# Patient Record
Sex: FEMALE | Race: BLACK OR AFRICAN AMERICAN | NOT HISPANIC OR LATINO
[De-identification: names, ages, dates, MRNs, and addresses within clinical notes are randomized per-mention and may not be internally consistent; named-entity substitution may affect disease eponyms.]

---

## 2017-05-08 ENCOUNTER — APPOINTMENT (OUTPATIENT)
Dept: RHEUMATOLOGY | Facility: CLINIC | Age: 82
End: 2017-05-08

## 2017-05-08 VITALS
BODY MASS INDEX: 31.24 KG/M2 | OXYGEN SATURATION: 98 % | HEIGHT: 64 IN | WEIGHT: 183 LBS | HEART RATE: 66 BPM | DIASTOLIC BLOOD PRESSURE: 76 MMHG | TEMPERATURE: 98.3 F | SYSTOLIC BLOOD PRESSURE: 138 MMHG

## 2017-05-08 DIAGNOSIS — Z87.39 PERSONAL HISTORY OF OTHER DISEASES OF THE MUSCULOSKELETAL SYSTEM AND CONNECTIVE TISSUE: ICD-10-CM

## 2017-05-08 DIAGNOSIS — I10 ESSENTIAL (PRIMARY) HYPERTENSION: ICD-10-CM

## 2017-05-08 DIAGNOSIS — Z85.038 PERSONAL HISTORY OF OTHER MALIGNANT NEOPLASM OF LARGE INTESTINE: ICD-10-CM

## 2017-05-08 DIAGNOSIS — Z80.1 FAMILY HISTORY OF MALIGNANT NEOPLASM OF TRACHEA, BRONCHUS AND LUNG: ICD-10-CM

## 2017-05-08 DIAGNOSIS — E78.5 HYPERLIPIDEMIA, UNSPECIFIED: ICD-10-CM

## 2017-05-08 RX ORDER — ALENDRONATE SODIUM 70 MG/1
70 TABLET ORAL
Qty: 4 | Refills: 0 | Status: ACTIVE | COMMUNITY
Start: 2017-01-06

## 2017-05-08 RX ORDER — OMEPRAZOLE 20 MG/1
20 CAPSULE, DELAYED RELEASE ORAL
Qty: 90 | Refills: 0 | Status: ACTIVE | COMMUNITY
Start: 2016-10-28

## 2017-05-08 RX ORDER — CLINDAMYCIN PHOSPHATE 1 G/10ML
1 GEL TOPICAL
Qty: 60 | Refills: 0 | Status: ACTIVE | COMMUNITY
Start: 2017-01-07

## 2017-05-08 RX ORDER — ERYTHROMYCIN 20 MG/ML
2 SOLUTION TOPICAL
Qty: 60 | Refills: 0 | Status: ACTIVE | COMMUNITY
Start: 2017-01-07

## 2017-05-08 RX ORDER — UBIDECARENONE/VIT E ACET 100MG-5
CAPSULE ORAL
Refills: 0 | Status: ACTIVE | COMMUNITY

## 2017-05-08 RX ORDER — ZINC SULFATE 50(220)MG
220 (50 ZN) CAPSULE ORAL
Qty: 60 | Refills: 0 | Status: ACTIVE | COMMUNITY
Start: 2017-01-07

## 2017-05-08 RX ORDER — CALCIPOTRIENE 50 UG/G
0.01 OINTMENT TOPICAL
Qty: 60 | Refills: 0 | Status: ACTIVE | COMMUNITY
Start: 2017-01-07

## 2017-05-08 RX ORDER — VITAMIN E ACID SUCCINATE 268 MG
TABLET ORAL
Refills: 0 | Status: ACTIVE | COMMUNITY

## 2017-05-08 RX ORDER — AMLODIPINE BESYLATE 5 MG/1
TABLET ORAL
Refills: 0 | Status: ACTIVE | COMMUNITY

## 2017-05-08 RX ORDER — MULTIVIT-MIN/IRON/FOLIC ACID/K 18-600-40
CAPSULE ORAL
Refills: 0 | Status: ACTIVE | COMMUNITY

## 2017-05-12 LAB
25(OH)D3 SERPL-MCNC: 37.2 NG/ML
ALBUMIN SERPL ELPH-MCNC: 4.3 G/DL
ALP BLD-CCNC: 71 U/L
ALT SERPL-CCNC: 28 U/L
ANION GAP SERPL CALC-SCNC: 19 MMOL/L
AST SERPL-CCNC: 32 U/L
BASOPHILS # BLD AUTO: 0.03 K/UL
BASOPHILS NFR BLD AUTO: 0.9 %
BILIRUB SERPL-MCNC: 0.6 MG/DL
BUN SERPL-MCNC: 12 MG/DL
CALCIUM SERPL-MCNC: 8.8 MG/DL
CALCIUM SERPL-MCNC: 8.8 MG/DL
CHLORIDE SERPL-SCNC: 106 MMOL/L
CK SERPL-CCNC: 201 U/L
CO2 SERPL-SCNC: 24 MMOL/L
CREAT SERPL-MCNC: 0.92 MG/DL
CRP SERPL-MCNC: 0.2 MG/DL
EOSINOPHIL # BLD AUTO: 0.24 K/UL
EOSINOPHIL NFR BLD AUTO: 7.4 %
ERYTHROCYTE [SEDIMENTATION RATE] IN BLOOD BY WESTERGREN METHOD: 19 MM/HR
GLUCOSE SERPL-MCNC: 96 MG/DL
HCT VFR BLD CALC: 43.7 %
HGB BLD-MCNC: 14.3 G/DL
IMM GRANULOCYTES NFR BLD AUTO: 0 %
LYMPHOCYTES # BLD AUTO: 0.85 K/UL
LYMPHOCYTES NFR BLD AUTO: 26.2 %
MAN DIFF?: NORMAL
MCHC RBC-ENTMCNC: 28.8 PG
MCHC RBC-ENTMCNC: 32.7 GM/DL
MCV RBC AUTO: 88.1 FL
MONOCYTES # BLD AUTO: 0.31 K/UL
MONOCYTES NFR BLD AUTO: 9.5 %
NEUTROPHILS # BLD AUTO: 1.82 K/UL
NEUTROPHILS NFR BLD AUTO: 56 %
PARATHYROID HORMONE INTACT: 121 PG/ML
PHOSPHATE SERPL-MCNC: 3.1 MG/DL
PLATELET # BLD AUTO: 201 K/UL
POTASSIUM SERPL-SCNC: 3.8 MMOL/L
PROT SERPL-MCNC: 7.4 G/DL
RBC # BLD: 4.96 M/UL
RBC # FLD: 13.8 %
SODIUM SERPL-SCNC: 149 MMOL/L
WBC # FLD AUTO: 3.25 K/UL

## 2017-05-15 LAB — COLLAGEN NTX SER-SCNC: 9.3 NM BCE

## 2017-07-11 ENCOUNTER — APPOINTMENT (OUTPATIENT)
Dept: RHEUMATOLOGY | Facility: CLINIC | Age: 82
End: 2017-07-11

## 2017-08-24 ENCOUNTER — RX RENEWAL (OUTPATIENT)
Age: 82
End: 2017-08-24

## 2017-08-24 RX ORDER — GABAPENTIN 300 MG/1
300 CAPSULE ORAL
Qty: 90 | Refills: 1 | Status: ACTIVE | COMMUNITY
Start: 2017-05-08 | End: 1900-01-01

## 2017-12-12 ENCOUNTER — APPOINTMENT (OUTPATIENT)
Dept: ORTHOPEDIC SURGERY | Facility: CLINIC | Age: 82
End: 2017-12-12
Payer: MEDICARE

## 2017-12-12 VITALS
HEIGHT: 63 IN | BODY MASS INDEX: 30.12 KG/M2 | SYSTOLIC BLOOD PRESSURE: 130 MMHG | DIASTOLIC BLOOD PRESSURE: 78 MMHG | WEIGHT: 170 LBS | HEART RATE: 70 BPM

## 2017-12-12 DIAGNOSIS — D17.20 BENIGN LIPOMATOUS NEOPLASM OF SKIN AND SUBCUTANEOUS TISSUE OF UNSPECIFIED LIMB: ICD-10-CM

## 2017-12-12 PROCEDURE — 99203 OFFICE O/P NEW LOW 30 MIN: CPT

## 2018-07-18 ENCOUNTER — EMERGENCY (EMERGENCY)
Facility: HOSPITAL | Age: 83
LOS: 1 days | Discharge: HOME | End: 2018-07-18
Attending: EMERGENCY MEDICINE
Payer: MEDICARE

## 2018-07-18 VITALS
TEMPERATURE: 98 F | OXYGEN SATURATION: 97 % | RESPIRATION RATE: 20 BRPM | WEIGHT: 175.05 LBS | DIASTOLIC BLOOD PRESSURE: 71 MMHG | HEART RATE: 57 BPM | SYSTOLIC BLOOD PRESSURE: 127 MMHG

## 2018-07-18 DIAGNOSIS — Z98.89 OTHER SPECIFIED POSTPROCEDURAL STATES: Chronic | ICD-10-CM

## 2018-07-18 PROCEDURE — 93971 EXTREMITY STUDY: CPT | Mod: 26

## 2018-07-18 PROCEDURE — 93971 EXTREMITY STUDY: CPT

## 2018-07-18 PROCEDURE — 99284 EMERGENCY DEPT VISIT MOD MDM: CPT | Mod: 25

## 2018-07-18 PROCEDURE — 99284 EMERGENCY DEPT VISIT MOD MDM: CPT

## 2018-07-18 RX ORDER — ACETAMINOPHEN 500 MG
975 TABLET ORAL ONCE
Qty: 0 | Refills: 0 | Status: COMPLETED | OUTPATIENT
Start: 2018-07-18 | End: 2018-07-18

## 2018-07-18 RX ORDER — DICLOFENAC SODIUM 30 MG/G
2 GEL TOPICAL
Qty: 1 | Refills: 0 | OUTPATIENT
Start: 2018-07-18 | End: 2018-08-16

## 2018-07-18 RX ORDER — IBUPROFEN 200 MG
600 TABLET ORAL ONCE
Qty: 0 | Refills: 0 | Status: COMPLETED | OUTPATIENT
Start: 2018-07-18 | End: 2018-07-18

## 2018-07-18 RX ORDER — CAPSAICIN 0.025 %
1 CREAM (GRAM) TOPICAL ONCE
Qty: 0 | Refills: 0 | Status: COMPLETED | OUTPATIENT
Start: 2018-07-18 | End: 2018-07-18

## 2018-07-18 RX ORDER — CAPSAICIN 0.025 %
1 CREAM (GRAM) TOPICAL ONCE
Qty: 0 | Refills: 0 | Status: DISCONTINUED | OUTPATIENT
Start: 2018-07-18 | End: 2018-07-18

## 2018-07-18 RX ADMIN — Medication 600 MILLIGRAM(S): at 16:17

## 2018-07-18 RX ADMIN — Medication 1 APPLICATION(S): at 16:18

## 2018-07-18 RX ADMIN — Medication 975 MILLIGRAM(S): at 16:18

## 2018-07-18 NOTE — ED PROVIDER NOTE - OBJECTIVE STATEMENT
82F with right posterior knee pain and swelling, ho Sibley's cyst. No trauma. Pain sharp, nonradiating, associated right leg swelling. NO DVT or PE history. patient has not taken anything for pain yet. NO f/c, no rashes

## 2018-07-18 NOTE — ED PROVIDER NOTE - CARE PLAN
Principal Discharge DX:	Baker's cyst of knee, right  Assessment and plan of treatment:	Your ultrasound shows a Baker's cyst, no blood clot.     You may use Tylenol 650mg every 8 hours as needed for pain. You may also use the pain cream prescribed to you, apply to the area as directed.     Please follow up with our orthopedics doctors in 1-2 weeks if your pain does not improve.

## 2018-07-18 NOTE — ED PROVIDER NOTE - PHYSICAL EXAMINATION
right knee: no joint effusion, nontender anterolateral knee, +fullness and ttp of right popliteal fossa, no calf ttp or swelling b/l, nontender right lower leg/ankle and foot. NV intact b/l LEs. skin no rashes

## 2018-07-18 NOTE — ED PROVIDER NOTE - PSH
S/P colon resection  1998  S/P hernia repair  Abdominal ( 1999 or 2000 )  S/P knee surgery  left  S/P shoulder surgery  right  S/P thyroid surgery  For Goiter ( 1959 )

## 2018-07-18 NOTE — ED ADULT NURSE NOTE - OBJECTIVE STATEMENT
81 y/o F presents to ED c/o R posterior knee pain and swelling, hx Baker's cyst. No trauma/injury. Pt also c/o associated R leg swelling. Pt has not taken anything for the pain yet. No fevers, chills, numbness/tingling/weakness.

## 2018-07-18 NOTE — ED PROVIDER NOTE - MEDICAL DECISION MAKING DETAILS
Attending MD Stephens: 82F with known Baker's cyst presenting with right posterior knee pain, no trauma. Exam notable for popliteal fullness consistent with Baker's cyst. Will obtain US to ro DVT however given report of leg swelling. PO analgesia and topical NSAIDs for pain

## 2018-07-18 NOTE — ED PROVIDER NOTE - PLAN OF CARE
Your ultrasound shows a Baker's cyst, no blood clot.     You may use Tylenol 650mg every 8 hours as needed for pain. You may also use the pain cream prescribed to you, apply to the area as directed.     Please follow up with our orthopedics doctors in 1-2 weeks if your pain does not improve.

## 2018-11-06 ENCOUNTER — INPATIENT (INPATIENT)
Facility: HOSPITAL | Age: 83
LOS: 3 days | Discharge: HOME | End: 2018-11-10
Attending: INTERNAL MEDICINE | Admitting: INTERNAL MEDICINE
Payer: MEDICARE

## 2018-11-06 VITALS
RESPIRATION RATE: 18 BRPM | DIASTOLIC BLOOD PRESSURE: 80 MMHG | HEART RATE: 85 BPM | OXYGEN SATURATION: 98 % | TEMPERATURE: 98 F | SYSTOLIC BLOOD PRESSURE: 148 MMHG

## 2018-11-06 DIAGNOSIS — Z98.89 OTHER SPECIFIED POSTPROCEDURAL STATES: Chronic | ICD-10-CM

## 2018-11-06 DIAGNOSIS — M54.9 DORSALGIA, UNSPECIFIED: ICD-10-CM

## 2018-11-06 LAB
ALBUMIN SERPL ELPH-MCNC: 4 G/DL — SIGNIFICANT CHANGE UP (ref 3.3–5)
ALP SERPL-CCNC: 81 U/L — SIGNIFICANT CHANGE UP (ref 40–120)
ALT FLD-CCNC: 25 U/L — SIGNIFICANT CHANGE UP (ref 4–33)
AST SERPL-CCNC: 28 U/L — SIGNIFICANT CHANGE UP (ref 4–32)
BASOPHILS # BLD AUTO: 0.04 K/UL — SIGNIFICANT CHANGE UP (ref 0–0.2)
BASOPHILS NFR BLD AUTO: 1 % — SIGNIFICANT CHANGE UP (ref 0–2)
BILIRUB SERPL-MCNC: 0.4 MG/DL — SIGNIFICANT CHANGE UP (ref 0.2–1.2)
BUN SERPL-MCNC: 12 MG/DL — SIGNIFICANT CHANGE UP (ref 7–23)
CALCIUM SERPL-MCNC: 8.4 MG/DL — SIGNIFICANT CHANGE UP (ref 8.4–10.5)
CHLORIDE SERPL-SCNC: 104 MMOL/L — SIGNIFICANT CHANGE UP (ref 98–107)
CO2 SERPL-SCNC: 28 MMOL/L — SIGNIFICANT CHANGE UP (ref 22–31)
CREAT SERPL-MCNC: 0.8 MG/DL — SIGNIFICANT CHANGE UP (ref 0.5–1.3)
EOSINOPHIL # BLD AUTO: 0.21 K/UL — SIGNIFICANT CHANGE UP (ref 0–0.5)
EOSINOPHIL NFR BLD AUTO: 5.4 % — SIGNIFICANT CHANGE UP (ref 0–6)
GLUCOSE SERPL-MCNC: 142 MG/DL — HIGH (ref 70–99)
HCT VFR BLD CALC: 42.2 % — SIGNIFICANT CHANGE UP (ref 34.5–45)
HGB BLD-MCNC: 13.8 G/DL — SIGNIFICANT CHANGE UP (ref 11.5–15.5)
IMM GRANULOCYTES # BLD AUTO: 0.02 # — SIGNIFICANT CHANGE UP
IMM GRANULOCYTES NFR BLD AUTO: 0.5 % — SIGNIFICANT CHANGE UP (ref 0–1.5)
LYMPHOCYTES # BLD AUTO: 1.18 K/UL — SIGNIFICANT CHANGE UP (ref 1–3.3)
LYMPHOCYTES # BLD AUTO: 30.3 % — SIGNIFICANT CHANGE UP (ref 13–44)
MCHC RBC-ENTMCNC: 29.5 PG — SIGNIFICANT CHANGE UP (ref 27–34)
MCHC RBC-ENTMCNC: 32.7 % — SIGNIFICANT CHANGE UP (ref 32–36)
MCV RBC AUTO: 90.2 FL — SIGNIFICANT CHANGE UP (ref 80–100)
MONOCYTES # BLD AUTO: 0.38 K/UL — SIGNIFICANT CHANGE UP (ref 0–0.9)
MONOCYTES NFR BLD AUTO: 9.7 % — SIGNIFICANT CHANGE UP (ref 2–14)
NEUTROPHILS # BLD AUTO: 2.07 K/UL — SIGNIFICANT CHANGE UP (ref 1.8–7.4)
NEUTROPHILS NFR BLD AUTO: 53.1 % — SIGNIFICANT CHANGE UP (ref 43–77)
NRBC # FLD: 0 — SIGNIFICANT CHANGE UP
PLATELET # BLD AUTO: 187 K/UL — SIGNIFICANT CHANGE UP (ref 150–400)
PMV BLD: 10.1 FL — SIGNIFICANT CHANGE UP (ref 7–13)
POTASSIUM SERPL-MCNC: 3.3 MMOL/L — LOW (ref 3.5–5.3)
POTASSIUM SERPL-SCNC: 3.3 MMOL/L — LOW (ref 3.5–5.3)
PROT SERPL-MCNC: 6.9 G/DL — SIGNIFICANT CHANGE UP (ref 6–8.3)
RBC # BLD: 4.68 M/UL — SIGNIFICANT CHANGE UP (ref 3.8–5.2)
RBC # FLD: 13.8 % — SIGNIFICANT CHANGE UP (ref 10.3–14.5)
SODIUM SERPL-SCNC: 145 MMOL/L — SIGNIFICANT CHANGE UP (ref 135–145)
WBC # BLD: 3.9 K/UL — SIGNIFICANT CHANGE UP (ref 3.8–10.5)
WBC # FLD AUTO: 3.9 K/UL — SIGNIFICANT CHANGE UP (ref 3.8–10.5)

## 2018-11-06 PROCEDURE — 99223 1ST HOSP IP/OBS HIGH 75: CPT

## 2018-11-06 RX ORDER — OXYCODONE AND ACETAMINOPHEN 5; 325 MG/1; MG/1
1 TABLET ORAL ONCE
Qty: 0 | Refills: 0 | Status: DISCONTINUED | OUTPATIENT
Start: 2018-11-06 | End: 2018-11-06

## 2018-11-06 RX ORDER — MORPHINE SULFATE 50 MG/1
4 CAPSULE, EXTENDED RELEASE ORAL EVERY 4 HOURS
Qty: 0 | Refills: 0 | Status: DISCONTINUED | OUTPATIENT
Start: 2018-11-06 | End: 2018-11-10

## 2018-11-06 RX ORDER — ONDANSETRON 8 MG/1
4 TABLET, FILM COATED ORAL ONCE
Qty: 0 | Refills: 0 | Status: COMPLETED | OUTPATIENT
Start: 2018-11-06 | End: 2018-11-06

## 2018-11-06 RX ORDER — GABAPENTIN 400 MG/1
300 CAPSULE ORAL EVERY 8 HOURS
Qty: 0 | Refills: 0 | Status: DISCONTINUED | OUTPATIENT
Start: 2018-11-06 | End: 2018-11-10

## 2018-11-06 RX ORDER — HEPARIN SODIUM 5000 [USP'U]/ML
5000 INJECTION INTRAVENOUS; SUBCUTANEOUS EVERY 8 HOURS
Qty: 0 | Refills: 0 | Status: DISCONTINUED | OUTPATIENT
Start: 2018-11-06 | End: 2018-11-10

## 2018-11-06 RX ORDER — MORPHINE SULFATE 50 MG/1
4 CAPSULE, EXTENDED RELEASE ORAL ONCE
Qty: 0 | Refills: 0 | Status: DISCONTINUED | OUTPATIENT
Start: 2018-11-06 | End: 2018-11-06

## 2018-11-06 RX ORDER — OXYCODONE HYDROCHLORIDE 5 MG/1
5 TABLET ORAL ONCE
Qty: 0 | Refills: 0 | Status: DISCONTINUED | OUTPATIENT
Start: 2018-11-06 | End: 2018-11-06

## 2018-11-06 RX ORDER — DIAZEPAM 5 MG
2 TABLET ORAL ONCE
Qty: 0 | Refills: 0 | Status: DISCONTINUED | OUTPATIENT
Start: 2018-11-06 | End: 2018-11-06

## 2018-11-06 RX ORDER — TRAMADOL HYDROCHLORIDE 50 MG/1
50 TABLET ORAL EVERY 6 HOURS
Qty: 0 | Refills: 0 | Status: DISCONTINUED | OUTPATIENT
Start: 2018-11-06 | End: 2018-11-07

## 2018-11-06 RX ORDER — LIDOCAINE 4 G/100G
1 CREAM TOPICAL ONCE
Qty: 0 | Refills: 0 | Status: COMPLETED | OUTPATIENT
Start: 2018-11-06 | End: 2018-11-06

## 2018-11-06 RX ORDER — ACETAMINOPHEN 500 MG
650 TABLET ORAL ONCE
Qty: 0 | Refills: 0 | Status: COMPLETED | OUTPATIENT
Start: 2018-11-06 | End: 2018-11-06

## 2018-11-06 RX ADMIN — OXYCODONE AND ACETAMINOPHEN 1 TABLET(S): 5; 325 TABLET ORAL at 19:47

## 2018-11-06 RX ADMIN — OXYCODONE AND ACETAMINOPHEN 1 TABLET(S): 5; 325 TABLET ORAL at 21:04

## 2018-11-06 RX ADMIN — MORPHINE SULFATE 4 MILLIGRAM(S): 50 CAPSULE, EXTENDED RELEASE ORAL at 16:20

## 2018-11-06 RX ADMIN — ONDANSETRON 4 MILLIGRAM(S): 8 TABLET, FILM COATED ORAL at 16:21

## 2018-11-06 RX ADMIN — Medication 650 MILLIGRAM(S): at 16:12

## 2018-11-06 RX ADMIN — Medication 2 MILLIGRAM(S): at 13:44

## 2018-11-06 RX ADMIN — Medication 2 MILLIGRAM(S): at 19:47

## 2018-11-06 RX ADMIN — MORPHINE SULFATE 4 MILLIGRAM(S): 50 CAPSULE, EXTENDED RELEASE ORAL at 22:00

## 2018-11-06 RX ADMIN — LIDOCAINE 1 PATCH: 4 CREAM TOPICAL at 13:43

## 2018-11-06 RX ADMIN — MORPHINE SULFATE 4 MILLIGRAM(S): 50 CAPSULE, EXTENDED RELEASE ORAL at 22:25

## 2018-11-06 RX ADMIN — Medication 650 MILLIGRAM(S): at 13:44

## 2018-11-06 RX ADMIN — OXYCODONE HYDROCHLORIDE 5 MILLIGRAM(S): 5 TABLET ORAL at 14:10

## 2018-11-06 RX ADMIN — MORPHINE SULFATE 4 MILLIGRAM(S): 50 CAPSULE, EXTENDED RELEASE ORAL at 17:00

## 2018-11-06 RX ADMIN — OXYCODONE HYDROCHLORIDE 5 MILLIGRAM(S): 5 TABLET ORAL at 16:12

## 2018-11-06 NOTE — ED PROVIDER NOTE - PROGRESS NOTE DETAILS
anand: pt received at sign over at 4pm. Seen by myself. she has a many year hx of back pain. sx were more persistent starting dec 2017. Had an MRI, results?? HAd an epidural? feb 2018, with relief of pain until approx last week, when pt noted low bacok pain radiating down rt lateral leg. told of sciatica by opt orthopedist and restared on gabapentin. Pt finds that gabapentin makes her drowsy and complaince is questionable. she uses occasional tylenol and aleve bid. She came to ED because of worsening pain.  exam: painful rt str leg raise at approx 4- degrees, with increasing pain to dorsiflexion of foot. full and nontender ROM rt hip.  Attempts made to discharge pt, including 2 doses percocet and valium. pt unable to sit up w/o severe pain. she is admitted for pain control.

## 2018-11-06 NOTE — H&P ADULT - NSHPPHYSICALEXAM_GEN_ALL_CORE
T(C): 36.6 (11-06-18 @ 22:30), Max: 36.8 (11-06-18 @ 20:41)  HR: 74 (11-06-18 @ 22:30) (60 - 85)  BP: 128/78 (11-06-18 @ 22:30) (128/78 - 148/80)  RR: 16 (11-06-18 @ 22:30) (16 - 18)  SpO2: 99% (11-06-18 @ 22:30) (98% - 99%)    GENERAL: No acute distress, well-developed  HEAD:  Atraumatic, Normocephalic  ENT: EOMI, PERRL, conjunctiva and sclera clear, Neck supple, moist mucosa  CHEST/LUNG: Clear to auscultation bilaterally; No wheeze, equal breath sounds bilaterally   HEART: Regular rate and rhythm; No murmurs, rubs, or gallops  ABDOMEN: Soft, Nontender, Nondistended; Bowel sounds present, no organomegaly  EXTREMITIES:  2+ Peripheral Pulses, No clubbing, cyanosis, or edema  PSYCH: AAOx3, normal affect, normal behavior   NEUROLOGY: non-focal, cranial nerves intact  SKIN: Normal color, No rashes or lesions  MUSCULOSKELETAL: + straight leg raise - R leg, no spinal or paraspinal tenderness, no joint swelling

## 2018-11-06 NOTE — ED ADULT TRIAGE NOTE - CHIEF COMPLAINT QUOTE
p/t with hx sciatica c/o of rt hip pain for past few days unable to walk due to pain, no neuro deficits noted

## 2018-11-06 NOTE — ED ADULT NURSE NOTE - NSIMPLEMENTINTERV_GEN_ALL_ED
Implemented All Universal Safety Interventions:  [place] to call system. Call bell, personal items and telephone within reach. Instruct patient to call for assistance. Room bathroom lighting operational. Non-slip footwear when patient is off stretcher. Physically safe environment: no spills, clutter or unnecessary equipment. Stretcher in lowest position, wheels locked, appropriate side rails in place.

## 2018-11-06 NOTE — SOCIAL WORK INITIAL EVALUATION ADULT - PLAN
Met with pt and  at bedside in the ED. Patient is an 82 yr old, AA, Christianity,  woman, aox4. Pt ambulates with cane and recently started using a RW due to hip pain. Pt lives in a house with spouse (David Coon:[phone]) and other family members.  Patient has 10-12 steps to manage in the home. Pt's large family assists with ADLs as needed.  Pt reports she is going for outpatient PT for her back pain. Pt declines need for additional services and community resources at this time.     Written by Zoe Palumbo SWI  Reviewed by Rochelle Troncoso LMSW

## 2018-11-06 NOTE — H&P ADULT - NSHPLABSRESULTS_GEN_ALL_CORE
.  LABS:                         13.8   3.90  )-----------( 187      ( 06 Nov 2018 17:27 )             42.2     11-06    145  |  104  |  12  ----------------------------<  142<H>  3.3<L>   |  28  |  0.80    Ca    8.4      06 Nov 2018 17:27    TPro  6.9  /  Alb  4.0  /  TBili  0.4  /  DBili  x   /  AST  28  /  ALT  25  /  AlkPhos  81  11-06          RADIOLOGY, EKG & ADDITIONAL TESTS: Reviewed.

## 2018-11-06 NOTE — H&P ADULT - HISTORY OF PRESENT ILLNESS
81 yo F with h/o HTN, HLD, chronic back pain, sciatica presenting with R buttock pain x 1 week.  Pt states that she has sharp 10/10 pain in right buttock that shoots down her R leg to her calf, worse with movement and sitting, no alleviating factors. She has had this pain in the past a bout 1 year ago and had been given ?epidural shot and pain has been under control since then until last week. She states that this pain is different from her chronic back pain which is midline lumbar pain and tolerable with her home pain regimen. She was seen by her orthopedic surgeon (Dr Morales) 5 days ago and was told this was likely sciatica. MRI was ordered (scheduled in 2 days) and she was referred to pain management (appt scheduled for 11/28). She was also prescribed gabapentin for her pain. Since the then pain has gotten worse and pt has difficulty ambulating due to the pain so she came to the ED.      In ED pt was given vallium 2mg PO x2, morphine 4mg IV x2, Tylenol PO, oxydocodone 5mg PO and percocet   VS:  133/75  62  97.1  17  99% on RA

## 2018-11-06 NOTE — H&P ADULT - NSHPREVIEWOFSYSTEMS_GEN_ALL_CORE
REVIEW OF SYSTEMS:    CONSTITUTIONAL: No weakness, fevers or chills, no weight loss  EYES/ENT: No visual changes;  No dysphagia or odynophagia, no tinnitus  NECK: No pain or stiffness  RESPIRATORY: No cough, wheezing, hemoptysis; No shortness of breath  CARDIOVASCULAR: No chest pain or palpitations; No lower extremity edema  GASTROINTESTINAL: No abdominal or epigastric pain. No nausea, vomiting, or hematemesis; No diarrhea or constipation. No melena or hematochezia.  MUSCULOSKELETAL: +BACK PAIN, +RIGHT BUTTOCK PAIN, No joint pain, swelling, erythema or warmth  GENITOURINARY: No dysuria, frequency or hematuria, no suprapubic pain  NEUROLOGICAL: No numbness or weakness, no headache, no syncope, no gait abnormalities   SKIN: No itching, burning, rashes, or lesions   All other review of systems is negative unless indicated above.

## 2018-11-06 NOTE — ED PROVIDER NOTE - OBJECTIVE STATEMENT
82F PMH HTN, chronic back pain, sciatica p/w R hip pain. Pt has been having R hip for several days. Reports sharp pain with radiation down leg past calf. Was seen by her orthopedist on Friday who told her she had sciatica and prescribed Gabapentin 300mg TID, gave her a referral for an MRI and pain mgmt. Since that appointment, pt feels that her pain has worsened, and I not relieved by the medication. MRI and pain mgmt are scheduled for upcoming weeks, but since pt was in uncontrolled pain she came to ER. Is having difficulty ambulating at home due to pain. No f/c, CP, SOB, n/v/d. No recent injections, no trauma.

## 2018-11-06 NOTE — H&P ADULT - PROBLEM SELECTOR PLAN 1
- Acute worsening of sciatica with no clear inciting event  - Pain poorly controlled with outpt pain meds and pt's mobility significantly hindered due to pain  - Continue gabapentin  - IV morphine prn, tramadol prn  - PT consult   - Consider pain management consult  - Pt was scheduled for out pt MRI, ordered by her orthopedic surgeon. Contact ortho (Dr Kevin Conner) in am

## 2018-11-06 NOTE — ED ADULT NURSE NOTE - OBJECTIVE STATEMENT
Patient to room 29 with c/o right hip pain. Pt has family at bedside. Pt evaluated by MD. Pain medication given as ordered and patient waiting for further orders, and disposition.  ROSA Lopez

## 2018-11-06 NOTE — ED PROVIDER NOTE - NS ED ROS FT
Constitution: No Fever or chills  Eyes: No visual changes  HEENT: No URI symptoms  Cardio: No Chest pain  Resp: No SOB  GI: No abdominal pain  : No dysuria  MSK: +R hip pain, chronic back pain  Neuro: No Headache  Skin: No rashes  All other ROS as per HPI  Eric Montague, PGY-1

## 2018-11-06 NOTE — H&P ADULT - ASSESSMENT
81 yo F with h/o HTN, HLD, chronic back pain, sciatica presenting with R buttock pain x 1 week c/w her sciatica

## 2018-11-06 NOTE — ED PROVIDER NOTE - MEDICAL DECISION MAKING DETAILS
82F w/ R hip pain. No trauma, likely sciatica, osteoarthritis. Pt has good outpatient f/u but pain is currently uncontrolled. Will attempt pain control and if pt is feeling well enough to go home will d/c for outpatient MRI on Thursday.

## 2018-11-06 NOTE — ED PROVIDER NOTE - ATTENDING CONTRIBUTION TO CARE
PHILOMENA GARCIA MD: 83 yo F with a past medical history of Hypertension, chronic low back pain a/w sciatica presents with RIGHT hip pain for the last several days. Patient states she has a sharp pain with radiation down leg to her foot. Outpt was seen and started on gabapentin 300mg TID, and a referral for an MRI and referral to see pain management. Patient states she's having difficulty ambulating secondary to pain and the Gabapentin has not helped.      PHYSICAL EXAM:  Vital signs reviewed.  GENERAL: Patient is awake and alert and in no acute distress.  Non-toxic appearing.  A+Ox4  HEAD:  Airway patent.  No oropharyngeal edema.  No stridor.  Auricles are normal.    EYES: EOM grossly intact, conjunctiva non-injected and sclera clear  NECK: Supple, No vertebral point tenderness to palpation.  CHEST/LUNG: Lungs clear to auscultation bilaterally; no wheeze, no rhonchi,  no rales.    HEART: Regular rate and rhythm;   ABDOMEN: Soft, non-tender to palpation.  No rebound/no guarding.  Bowel sounds present x 4.   MSK/EXTREMITIES: No clubbing or cyanosis. Back is nontender, with no vertebral point tenderness to palpation, no CVAT.  Moving all 4 extremities.   Decreased ROM and strength on hip flexion secondary to pain.  Distally NVI.  2+ edema B/L LE.  NEURO: Neurologically grossly intact.   No obvious deficits.  Unable to ambulate secondary to pain.  PSYCH: Psychiatrically normal mood and affect.  No apparent risk to self or others.       DR. WADSWORTH, ATTENDING MD:    I performed a face to face bedside interview with patient regarding history of present illness, review of symptoms and past medical history. I completed an independent physical exam.  I have discussed patient's plan of care with the team of health care providers.   I agree with note as stated above, having amended the EMR as needed to reflect my findings. I have discussed the assessment and plan of care.  This includes during the time I functioned as the attending physician for this patient. PHILOMENA GARCIA MD: 81 yo F with a past medical history of Hypertension, chronic low back pain a/w sciatica presents with RIGHT hip pain for the last several days. Patient states she has a sharp pain with radiation down leg to her foot. Outpt was seen and started on gabapentin 300mg TID, and a referral for an MRI and referral to see pain management. Patient states she's having difficulty ambulating secondary to pain and the Gabapentin has not helped.  Patient denies F/C, HA, NP, chest pain, SOB, cough, abd pain, N/V/D/C, dizziness, urinary symptoms, BI/BI, numbness/tingling, specifically no saddle anesthsia or other complaints.     PHYSICAL EXAM:  Vital signs reviewed.  GENERAL: Patient is awake and alert and in no acute distress.  Non-toxic appearing.  A+Ox4  HEAD:  Airway patent.  No oropharyngeal edema.  No stridor.  Auricles are normal.    EYES: EOM grossly intact, conjunctiva non-injected and sclera clear  NECK: Supple, No vertebral point tenderness to palpation.  CHEST/LUNG: Lungs clear to auscultation bilaterally; no wheeze, no rhonchi,  no rales.    HEART: Regular rate and rhythm;   ABDOMEN: Soft, non-tender to palpation.  No rebound/no guarding.  Bowel sounds present x 4.   MSK/EXTREMITIES: No clubbing or cyanosis. Back is nontender, with no vertebral point tenderness to palpation, no CVAT.  Moving all 4 extremities.   Decreased ROM and strength on hip flexion secondary to pain.  Distally NVI.  2+ edema B/L LE.  NEURO: Neurologically grossly intact.   No obvious deficits.  Unable to ambulate secondary to pain.  PSYCH: Psychiatrically normal mood and affect.  No apparent risk to self or others.       DR. WADSWORTH, ATTENDING MD:    I performed a face to face bedside interview with patient regarding history of present illness, review of symptoms and past medical history. I completed an independent physical exam.  I have discussed patient's plan of care with the team of health care providers.   I agree with note as stated above, having amended the EMR as needed to reflect my findings. I have discussed the assessment and plan of care.  This includes during the time I functioned as the attending physician for this patient.

## 2018-11-06 NOTE — ED PROVIDER NOTE - PHYSICAL EXAMINATION
General: WDWN  HEENT: Normocephalic and atraumatic, EOMI, Trachea midline.   Cardiac: Normal S1 and S2 w/ RRR. No MRG.  Pulmonary: CTA bilaterally. No increased WOB.   Abdominal: Soft, NTND  Neurologic: Muscle strength 5/5 in BL lower limbs. There is some weakness with hip flexion due to pain.   Musculoskeletal: +straight leg raise, decreased ROM due to pain  Vascular: DP pulses in BL LE 2+ and equal. WWP  Skin: Color appropriate for race.   Psychiatric: Appropriate mood and affect. No apparent risk to self or others.  Eric Montague, PGY-1

## 2018-11-07 ENCOUNTER — TRANSCRIPTION ENCOUNTER (OUTPATIENT)
Age: 83
End: 2018-11-07

## 2018-11-07 DIAGNOSIS — I10 ESSENTIAL (PRIMARY) HYPERTENSION: ICD-10-CM

## 2018-11-07 DIAGNOSIS — M54.41 LUMBAGO WITH SCIATICA, RIGHT SIDE: ICD-10-CM

## 2018-11-07 DIAGNOSIS — E78.5 HYPERLIPIDEMIA, UNSPECIFIED: ICD-10-CM

## 2018-11-07 DIAGNOSIS — M54.9 DORSALGIA, UNSPECIFIED: ICD-10-CM

## 2018-11-07 LAB
BUN SERPL-MCNC: 17 MG/DL — SIGNIFICANT CHANGE UP (ref 7–23)
CALCIUM SERPL-MCNC: 8.5 MG/DL — SIGNIFICANT CHANGE UP (ref 8.4–10.5)
CHLORIDE SERPL-SCNC: 98 MMOL/L — SIGNIFICANT CHANGE UP (ref 98–107)
CO2 SERPL-SCNC: 29 MMOL/L — SIGNIFICANT CHANGE UP (ref 22–31)
CREAT SERPL-MCNC: 0.84 MG/DL — SIGNIFICANT CHANGE UP (ref 0.5–1.3)
GLUCOSE SERPL-MCNC: 136 MG/DL — HIGH (ref 70–99)
MAGNESIUM SERPL-MCNC: 2.4 MG/DL — SIGNIFICANT CHANGE UP (ref 1.6–2.6)
POTASSIUM SERPL-MCNC: 3.4 MMOL/L — LOW (ref 3.5–5.3)
POTASSIUM SERPL-SCNC: 3.4 MMOL/L — LOW (ref 3.5–5.3)
SODIUM SERPL-SCNC: 140 MMOL/L — SIGNIFICANT CHANGE UP (ref 135–145)

## 2018-11-07 RX ORDER — ASPIRIN/CALCIUM CARB/MAGNESIUM 324 MG
81 TABLET ORAL DAILY
Qty: 0 | Refills: 0 | Status: DISCONTINUED | OUTPATIENT
Start: 2018-11-07 | End: 2018-11-10

## 2018-11-07 RX ORDER — ASPIRIN/CALCIUM CARB/MAGNESIUM 324 MG
1 TABLET ORAL
Qty: 0 | Refills: 0 | COMMUNITY

## 2018-11-07 RX ORDER — SIMVASTATIN 20 MG/1
1 TABLET, FILM COATED ORAL
Qty: 0 | Refills: 0 | COMMUNITY

## 2018-11-07 RX ORDER — ACETAMINOPHEN 500 MG
2 TABLET ORAL
Qty: 0 | Refills: 0 | COMMUNITY

## 2018-11-07 RX ORDER — ALENDRONATE SODIUM 70 MG/1
1 TABLET ORAL
Qty: 0 | Refills: 0 | COMMUNITY

## 2018-11-07 RX ORDER — PREGABALIN 225 MG/1
1000 CAPSULE ORAL DAILY
Qty: 0 | Refills: 0 | Status: DISCONTINUED | OUTPATIENT
Start: 2018-11-07 | End: 2018-11-10

## 2018-11-07 RX ORDER — POTASSIUM CHLORIDE 20 MEQ
20 PACKET (EA) ORAL ONCE
Qty: 0 | Refills: 0 | Status: COMPLETED | OUTPATIENT
Start: 2018-11-07 | End: 2018-11-07

## 2018-11-07 RX ORDER — OXYCODONE AND ACETAMINOPHEN 5; 325 MG/1; MG/1
1 TABLET ORAL EVERY 4 HOURS
Qty: 0 | Refills: 0 | Status: DISCONTINUED | OUTPATIENT
Start: 2018-11-07 | End: 2018-11-08

## 2018-11-07 RX ORDER — AMLODIPINE BESYLATE 2.5 MG/1
10 TABLET ORAL DAILY
Qty: 0 | Refills: 0 | Status: DISCONTINUED | OUTPATIENT
Start: 2018-11-07 | End: 2018-11-10

## 2018-11-07 RX ORDER — GABAPENTIN 400 MG/1
1 CAPSULE ORAL
Qty: 0 | Refills: 0 | COMMUNITY

## 2018-11-07 RX ORDER — SIMVASTATIN 20 MG/1
5 TABLET, FILM COATED ORAL AT BEDTIME
Qty: 0 | Refills: 0 | Status: DISCONTINUED | OUTPATIENT
Start: 2018-11-07 | End: 2018-11-10

## 2018-11-07 RX ORDER — POTASSIUM CHLORIDE 20 MEQ
1 PACKET (EA) ORAL
Qty: 0 | Refills: 0 | COMMUNITY

## 2018-11-07 RX ORDER — AMLODIPINE BESYLATE 2.5 MG/1
1 TABLET ORAL
Qty: 0 | Refills: 0 | COMMUNITY

## 2018-11-07 RX ORDER — PANTOPRAZOLE SODIUM 20 MG/1
40 TABLET, DELAYED RELEASE ORAL
Qty: 0 | Refills: 0 | Status: DISCONTINUED | OUTPATIENT
Start: 2018-11-07 | End: 2018-11-10

## 2018-11-07 RX ADMIN — GABAPENTIN 300 MILLIGRAM(S): 400 CAPSULE ORAL at 06:18

## 2018-11-07 RX ADMIN — MORPHINE SULFATE 4 MILLIGRAM(S): 50 CAPSULE, EXTENDED RELEASE ORAL at 06:40

## 2018-11-07 RX ADMIN — HEPARIN SODIUM 5000 UNIT(S): 5000 INJECTION INTRAVENOUS; SUBCUTANEOUS at 06:18

## 2018-11-07 RX ADMIN — LIDOCAINE 1 PATCH: 4 CREAM TOPICAL at 01:31

## 2018-11-07 RX ADMIN — SIMVASTATIN 5 MILLIGRAM(S): 20 TABLET, FILM COATED ORAL at 22:01

## 2018-11-07 RX ADMIN — MORPHINE SULFATE 4 MILLIGRAM(S): 50 CAPSULE, EXTENDED RELEASE ORAL at 06:18

## 2018-11-07 RX ADMIN — GABAPENTIN 300 MILLIGRAM(S): 400 CAPSULE ORAL at 22:01

## 2018-11-07 RX ADMIN — HEPARIN SODIUM 5000 UNIT(S): 5000 INJECTION INTRAVENOUS; SUBCUTANEOUS at 13:25

## 2018-11-07 RX ADMIN — GABAPENTIN 300 MILLIGRAM(S): 400 CAPSULE ORAL at 13:25

## 2018-11-07 RX ADMIN — Medication 81 MILLIGRAM(S): at 13:25

## 2018-11-07 RX ADMIN — HEPARIN SODIUM 5000 UNIT(S): 5000 INJECTION INTRAVENOUS; SUBCUTANEOUS at 22:01

## 2018-11-07 RX ADMIN — AMLODIPINE BESYLATE 10 MILLIGRAM(S): 2.5 TABLET ORAL at 13:26

## 2018-11-07 RX ADMIN — Medication 1 TABLET(S): at 13:25

## 2018-11-07 RX ADMIN — PANTOPRAZOLE SODIUM 40 MILLIGRAM(S): 20 TABLET, DELAYED RELEASE ORAL at 08:49

## 2018-11-07 RX ADMIN — Medication 20 MILLIEQUIVALENT(S): at 16:54

## 2018-11-07 RX ADMIN — PREGABALIN 1000 MICROGRAM(S): 225 CAPSULE ORAL at 13:25

## 2018-11-07 NOTE — PHYSICAL THERAPY INITIAL EVALUATION ADULT - ADDITIONAL COMMENTS
Pt. reports owning DME of straight cane, rolling walker.     Pt. was left supine in bed post PT Evaluation, NAD, call malin within reach. ROSA Fragoso made aware of pt. status and participation in PT.

## 2018-11-07 NOTE — PROGRESS NOTE ADULT - PROBLEM SELECTOR PLAN 1
ortho spine evaluation called by me  yvonne continue pain meds  pain management evaluation  MRI LS spine

## 2018-11-07 NOTE — DISCHARGE NOTE ADULT - MEDICATION SUMMARY - MEDICATIONS TO STOP TAKING
I will STOP taking the medications listed below when I get home from the hospital:    Pennsaid 2% topical solution  -- Apply on skin to affected area , As Needed

## 2018-11-07 NOTE — PHYSICAL THERAPY INITIAL EVALUATION ADULT - CRITERIA FOR SKILLED THERAPEUTIC INTERVENTIONS
risk reduction/prevention/therapy frequency/rehab potential/anticipated discharge recommendation/impairments found/predicted duration of therapy intervention

## 2018-11-07 NOTE — DISCHARGE NOTE ADULT - OTHER SIGNIFICANT FINDINGS
MR Lumbar Spine No Cont (11.08.18 @ 02:40) >  IMPRESSION:  Multilevel degenerative spondylosis, increased from 2009.  Likely insufficiency fracture of L4 with superior concavity of the L4   endplate associated with marrow edema.  Abnormal intervertebral disc space signal at T10-T11 associated marrow   edema. Findings may represent degenerative change however, if there is   clinical concern for discitis osteomyelitis, contrast-enhanced MR imaging   of the thoracic spine may be done for further evaluation.

## 2018-11-07 NOTE — PHYSICAL THERAPY INITIAL EVALUATION ADULT - LIVES WITH, PROFILE
Lives in a house with her . Has 3 steps to enter. 3 steps then 6 then 2 inside with bilateral handrails.

## 2018-11-07 NOTE — PROGRESS NOTE ADULT - SUBJECTIVE AND OBJECTIVE BOX
Patient is a 82y old  Female who presents with a chief complaint of R buttock pain/sciatica (06 Nov 2018 23:36)  patient not known to me, assigned this AM to assume care  chart reviewed and events thus far noted   admitted overnight by full time hospitalist service       SUBJECTIVE / OVERNIGHT EVENTS: no overnight events    ROS:  Resp: No cough no sputum production  CVS: No chest pain no palpitations no orthopnea  GI: no N/V/D  : no dysuria, no hematuria  Neuro: no weakness no paresthesias  Heme: No petechiae no easy bruising  Msk: No joint pain no swelling  Skin: No rash no itching        MEDICATIONS  (STANDING):  amLODIPine   Tablet 10 milliGRAM(s) Oral daily  aspirin enteric coated 81 milliGRAM(s) Oral daily  calcium carbonate 1250 mG  + Vitamin D (OsCal 500 + D) 1 Tablet(s) Oral daily  cyanocobalamin 1000 MICROGram(s) Oral daily  gabapentin 300 milliGRAM(s) Oral every 8 hours  heparin  Injectable 5000 Unit(s) SubCutaneous every 8 hours  pantoprazole    Tablet 40 milliGRAM(s) Oral before breakfast  simvastatin 5 milliGRAM(s) Oral at bedtime    MEDICATIONS  (PRN):  morphine  - Injectable 4 milliGRAM(s) IV Push every 4 hours PRN Severe Pain (7 - 10)  oxyCODONE    5 mG/acetaminophen 325 mG 1 Tablet(s) Oral every 4 hours PRN Moderate Pain (4 - 6)        CAPILLARY BLOOD GLUCOSE        I&O's Summary      Vital Signs Last 24 Hrs  T(C): 36.6 (07 Nov 2018 06:10), Max: 36.8 (06 Nov 2018 20:41)  T(F): 97.9 (07 Nov 2018 06:10), Max: 98.2 (06 Nov 2018 20:41)  HR: 54 (07 Nov 2018 06:10) (52 - 85)  BP: 113/67 (07 Nov 2018 06:10) (113/67 - 151/80)  BP(mean): --  RR: 16 (07 Nov 2018 06:10) (16 - 18)  SpO2: 98% (07 Nov 2018 06:10) (97% - 99%)    GENERAL: No acute distress, well-developed  HEAD:  Atraumatic, Normocephalic  ENT: EOMI, PERRL, conjunctiva and sclera clear, Neck supple, moist mucosa  CHEST/LUNG: Clear to auscultation bilaterally; No wheeze, equal breath sounds bilaterally   HEART: Regular rate and rhythm; No murmurs, rubs, or gallops  ABDOMEN: Soft, Nontender, Nondistended; Bowel sounds present, no organomegaly  EXTREMITIES:  2+ Peripheral Pulses, No clubbing, cyanosis, or edema  PSYCH: AAOx3, normal affect, normal behavior   NEUROLOGY: non-focal, cranial nerves intact  SKIN: Normal color, No rashes or lesions  MUSCULOSKELETAL: + straight leg raise - R leg, no spinal or paraspinal tenderness, no joint swelling    LABS:                        13.8   3.90  )-----------( 187      ( 06 Nov 2018 17:27 )             42.2     11-06    145  |  104  |  12  ----------------------------<  142<H>  3.3<L>   |  28  |  0.80    Ca    8.4      06 Nov 2018 17:27    TPro  6.9  /  Alb  4.0  /  TBili  0.4  /  DBili  x   /  AST  28  /  ALT  25  /  AlkPhos  81  11-06      All consultant(s) notes reviewed and care discussed with other providers    Contact Number, Dr Velasco [phone]

## 2018-11-07 NOTE — DISCHARGE NOTE ADULT - PLAN OF CARE
Pain control Continue with pain control as prescribed. Out patient physical therapy. Out patient follow up with Dr. Iqbal (spine doctor). Continue current blood pressure medication regimen as directed. Monitor for any visual changes, headaches or dizziness.  Monitor blood pressure regularly.  Follow up with your PCP for further management for high blood pressure, please call to make appointment within 1 week of discharge Continue cholesterol control medications. Continue DASH diet. Follow up with your PCP within 1 week of discharge for further management and monitoring of lipid and cholesterol panels. Please call to make an appointment

## 2018-11-07 NOTE — DISCHARGE NOTE ADULT - HOSPITAL COURSE
81 yo F with h/o HTN, HLD, chronic back pain, sciatica presenting with R buttock pain x 1 week c/w her sciatica     Hospital course:    Exacerbation of chronic back pain.    -S/p MRI spine  -Ortho spine consulted: LSO brace  -ID consulted: Bcx: NTD, ESR normal, unlikely to be infectious   -Physical therapy consulted: recommended out patient PT   -Pain mgmt consulted: recommendations implemented including NSAID    Essential hypertension.   -Continued with home meds with hold parameters  -DASH/TLC diet    Hyperlipidemia, unspecified hyperlipidemia type.   -Continued statin  -DASH/TLC diet    Dispo: home with out patient PT and outpatient follow up with Dr. Iqbal

## 2018-11-07 NOTE — DISCHARGE NOTE ADULT - PATIENT PORTAL LINK FT
You can access the [facility] Patient Portal, offered by [facility], by registering with the following website: [email or web address]

## 2018-11-07 NOTE — DISCHARGE NOTE ADULT - CARE PROVIDER_API CALL
Heladio Iqbal (MD), Orthopaedic Surgery  [address] [place] [address]  Phone: [phone]  Fax: [phone]    Dr. Tucker,   Phone: (   )    -  Fax: (   )    -

## 2018-11-07 NOTE — DISCHARGE NOTE ADULT - MEDICATION SUMMARY - MEDICATIONS TO TAKE
I will START or STAY ON the medications listed below when I get home from the hospital:    Out patient physical therapy  -- 3x/week  -- Indication: For Walking/strength    naproxen 500 mg oral tablet  -- 1 tab(s) by mouth 2 times a day   -- Check with your doctor before becoming pregnant.  May cause drowsiness or dizziness.  Obtain medical advice before taking any non-prescription drugs as some may affect the action of this medication.  Take with food or milk.    -- Indication: For Exacerbation of chronic back pain    aspirin 81 mg oral delayed release tablet  -- 1 tab(s) by mouth once a day  -- Indication: For Prophylaxis    Tylenol 8 HR Arthritis Pain 650 mg oral tablet, extended release  -- 2 tab(s) by mouth every 8 hours, As Needed  -- Indication: For Chronic midline low back pain with right-sided sciatica    gabapentin 300 mg oral capsule  -- 1 cap(s) by mouth 3 times a day  -- Indication: For Chronic midline low back pain with right-sided sciatica    simvastatin 5 mg oral tablet  -- 1 tab(s) by mouth once a day (at bedtime)  -- Indication: For Hyperlipidemia, unspecified hyperlipidemia type    alendronate 70 mg oral tablet  -- 1 tab(s) by mouth once a week  -- Indication: For Osteoporosis    amLODIPine 10 mg oral tablet  -- 1 tab(s) by mouth once a day  -- Indication: For Hypertension    senna oral tablet  -- 2 tab(s) by mouth once a day (at bedtime)  -- Indication: For Constipation    polyethylene glycol 3350 oral powder for reconstitution  -- 17 gram(s) by mouth once a day  -- Indication: For Constipation    potassium chloride 20 mEq oral tablet, extended release  -- 1 tab(s) by mouth 2 times a day  -- Indication: For Supplement    zinc sulfate  -- 1 tab(s) by mouth once a day  -- Indication: For Supplement    pantoprazole 40 mg oral delayed release tablet  -- 1 tab(s) by mouth once a day (before a meal)  -- Indication: For Prophylaxis    Calcium 600+D oral tablet  -- 1 tab(s) by mouth once a day  -- Indication: For Supplement    vitamin E oral capsule  -- 1 cap(s) by mouth once a day  -- Indication: For Supplement    Vitamin C 500 mg oral tablet  -- 1 tab(s) by mouth once a day  -- Indication: For Supplement    Vitamin B-12  -- 1 tab(s) by mouth once a day  -- Indication: For Supplement

## 2018-11-07 NOTE — DISCHARGE NOTE ADULT - CARE PLAN
Principal Discharge DX:	Exacerbation of chronic back pain  Goal:	Pain control  Assessment and plan of treatment:	Continue with pain control as prescribed. Out patient physical therapy. Out patient follow up with Dr. Iqbal (spine doctor).  Secondary Diagnosis:	Essential hypertension  Assessment and plan of treatment:	Continue current blood pressure medication regimen as directed. Monitor for any visual changes, headaches or dizziness.  Monitor blood pressure regularly.  Follow up with your PCP for further management for high blood pressure, please call to make appointment within 1 week of discharge  Secondary Diagnosis:	Hyperlipidemia, unspecified hyperlipidemia type  Assessment and plan of treatment:	Continue cholesterol control medications. Continue DASH diet. Follow up with your PCP within 1 week of discharge for further management and monitoring of lipid and cholesterol panels. Please call to make an appointment

## 2018-11-07 NOTE — PHYSICAL THERAPY INITIAL EVALUATION ADULT - RANGE OF MOTION EXAMINATION, REHAB EVAL
Left LE ROM was WFL (within functional limits)/Right hip and knee ROM limited secondary to pain; right ankle ROM WNL/bilateral lower extremity was ROM was WNL (within normal limits)

## 2018-11-07 NOTE — DISCHARGE NOTE ADULT - ADDITIONAL INSTRUCTIONS
Follow up with PCP within 1 week of discharge.  Follow up with Dr. Iqbal (spine doctor) upon discharge.  Out patient physical therapy.

## 2018-11-07 NOTE — PHYSICAL THERAPY INITIAL EVALUATION ADULT - GAIT DEVIATIONS NOTED, PT EVAL
decreased brenda/increased time in double stance/decreased step length/decreased weight-shifting ability

## 2018-11-08 LAB
ALBUMIN SERPL ELPH-MCNC: 3.7 G/DL — SIGNIFICANT CHANGE UP (ref 3.3–5)
ALP SERPL-CCNC: 74 U/L — SIGNIFICANT CHANGE UP (ref 40–120)
ALT FLD-CCNC: 19 U/L — SIGNIFICANT CHANGE UP (ref 4–33)
AST SERPL-CCNC: 19 U/L — SIGNIFICANT CHANGE UP (ref 4–32)
BILIRUB SERPL-MCNC: 0.4 MG/DL — SIGNIFICANT CHANGE UP (ref 0.2–1.2)
BUN SERPL-MCNC: 20 MG/DL — SIGNIFICANT CHANGE UP (ref 7–23)
CALCIUM SERPL-MCNC: 9.2 MG/DL — SIGNIFICANT CHANGE UP (ref 8.4–10.5)
CHLORIDE SERPL-SCNC: 102 MMOL/L — SIGNIFICANT CHANGE UP (ref 98–107)
CO2 SERPL-SCNC: 29 MMOL/L — SIGNIFICANT CHANGE UP (ref 22–31)
CREAT SERPL-MCNC: 0.83 MG/DL — SIGNIFICANT CHANGE UP (ref 0.5–1.3)
CRP SERPL-MCNC: < 4 MG/L — SIGNIFICANT CHANGE UP
ERYTHROCYTE [SEDIMENTATION RATE] IN BLOOD: 12 MM/HR — SIGNIFICANT CHANGE UP (ref 4–25)
GLUCOSE SERPL-MCNC: 97 MG/DL — SIGNIFICANT CHANGE UP (ref 70–99)
MAGNESIUM SERPL-MCNC: 2.3 MG/DL — SIGNIFICANT CHANGE UP (ref 1.6–2.6)
POTASSIUM SERPL-MCNC: 3.7 MMOL/L — SIGNIFICANT CHANGE UP (ref 3.5–5.3)
POTASSIUM SERPL-SCNC: 3.7 MMOL/L — SIGNIFICANT CHANGE UP (ref 3.5–5.3)
PROT SERPL-MCNC: 6.6 G/DL — SIGNIFICANT CHANGE UP (ref 6–8.3)
SODIUM SERPL-SCNC: 141 MMOL/L — SIGNIFICANT CHANGE UP (ref 135–145)

## 2018-11-08 PROCEDURE — 72148 MRI LUMBAR SPINE W/O DYE: CPT | Mod: 26

## 2018-11-08 PROCEDURE — 99233 SBSQ HOSP IP/OBS HIGH 50: CPT

## 2018-11-08 RX ORDER — SENNA PLUS 8.6 MG/1
2 TABLET ORAL AT BEDTIME
Qty: 0 | Refills: 0 | Status: DISCONTINUED | OUTPATIENT
Start: 2018-11-08 | End: 2018-11-10

## 2018-11-08 RX ORDER — POLYETHYLENE GLYCOL 3350 17 G/17G
17 POWDER, FOR SOLUTION ORAL DAILY
Qty: 0 | Refills: 0 | Status: DISCONTINUED | OUTPATIENT
Start: 2018-11-08 | End: 2018-11-10

## 2018-11-08 RX ORDER — OXYCODONE AND ACETAMINOPHEN 5; 325 MG/1; MG/1
1 TABLET ORAL EVERY 4 HOURS
Qty: 0 | Refills: 0 | Status: DISCONTINUED | OUTPATIENT
Start: 2018-11-08 | End: 2018-11-10

## 2018-11-08 RX ADMIN — SIMVASTATIN 5 MILLIGRAM(S): 20 TABLET, FILM COATED ORAL at 21:18

## 2018-11-08 RX ADMIN — POLYETHYLENE GLYCOL 3350 17 GRAM(S): 17 POWDER, FOR SOLUTION ORAL at 18:28

## 2018-11-08 RX ADMIN — HEPARIN SODIUM 5000 UNIT(S): 5000 INJECTION INTRAVENOUS; SUBCUTANEOUS at 21:18

## 2018-11-08 RX ADMIN — PREGABALIN 1000 MICROGRAM(S): 225 CAPSULE ORAL at 13:49

## 2018-11-08 RX ADMIN — GABAPENTIN 300 MILLIGRAM(S): 400 CAPSULE ORAL at 21:18

## 2018-11-08 RX ADMIN — PANTOPRAZOLE SODIUM 40 MILLIGRAM(S): 20 TABLET, DELAYED RELEASE ORAL at 06:23

## 2018-11-08 RX ADMIN — SENNA PLUS 2 TABLET(S): 8.6 TABLET ORAL at 21:18

## 2018-11-08 RX ADMIN — Medication 81 MILLIGRAM(S): at 13:49

## 2018-11-08 RX ADMIN — Medication 1 TABLET(S): at 13:49

## 2018-11-08 RX ADMIN — GABAPENTIN 300 MILLIGRAM(S): 400 CAPSULE ORAL at 13:49

## 2018-11-08 RX ADMIN — AMLODIPINE BESYLATE 10 MILLIGRAM(S): 2.5 TABLET ORAL at 06:24

## 2018-11-08 RX ADMIN — GABAPENTIN 300 MILLIGRAM(S): 400 CAPSULE ORAL at 06:23

## 2018-11-08 RX ADMIN — HEPARIN SODIUM 5000 UNIT(S): 5000 INJECTION INTRAVENOUS; SUBCUTANEOUS at 06:23

## 2018-11-08 RX ADMIN — HEPARIN SODIUM 5000 UNIT(S): 5000 INJECTION INTRAVENOUS; SUBCUTANEOUS at 13:50

## 2018-11-08 NOTE — PROGRESS NOTE ADULT - SUBJECTIVE AND OBJECTIVE BOX
Patient is a 82y old  Female who presents with a chief complaint of R buttock pain/sciatica (07 Nov 2018 13:19)      SUBJECTIVE / OVERNIGHT EVENTS: no overnight events    ROS:  Resp: No cough no sputum production  CVS: No chest pain no palpitations no orthopnea  GI: no N/V/D  : no dysuria, no hematuria  Neuro: no weakness no paresthesias  Heme: No petechiae no easy bruising  Msk: No joint pain no swelling  Skin: No rash no itching        MEDICATIONS  (STANDING):  amLODIPine   Tablet 10 milliGRAM(s) Oral daily  aspirin enteric coated 81 milliGRAM(s) Oral daily  calcium carbonate 1250 mG  + Vitamin D (OsCal 500 + D) 1 Tablet(s) Oral daily  cyanocobalamin 1000 MICROGram(s) Oral daily  gabapentin 300 milliGRAM(s) Oral every 8 hours  heparin  Injectable 5000 Unit(s) SubCutaneous every 8 hours  pantoprazole    Tablet 40 milliGRAM(s) Oral before breakfast  simvastatin 5 milliGRAM(s) Oral at bedtime    MEDICATIONS  (PRN):  morphine  - Injectable 4 milliGRAM(s) IV Push every 4 hours PRN Severe Pain (7 - 10)  oxyCODONE    5 mG/acetaminophen 325 mG 1 Tablet(s) Oral every 4 hours PRN Moderate Pain (4 - 6)        CAPILLARY BLOOD GLUCOSE        I&O's Summary      Vital Signs Last 24 Hrs  T(C): 36.7 (08 Nov 2018 12:22), Max: 36.9 (07 Nov 2018 21:55)  T(F): 98.1 (08 Nov 2018 12:22), Max: 98.5 (07 Nov 2018 21:55)  HR: 70 (08 Nov 2018 12:22) (56 - 70)  BP: 112/68 (08 Nov 2018 12:22) (112/68 - 136/71)  BP(mean): --  RR: 18 (08 Nov 2018 12:22) (18 - 18)  SpO2: 96% (08 Nov 2018 12:22) (96% - 99%)    GENERAL: No acute distress, well-developed  HEAD:  Atraumatic, Normocephalic  ENT: EOMI, PERRL, conjunctiva and sclera clear, Neck supple, moist mucosa  CHEST/LUNG: Clear to auscultation bilaterally; No wheeze, equal breath sounds bilaterally   HEART: Regular rate and rhythm; No murmurs, rubs, or gallops  ABDOMEN: Soft, Nontender, Nondistended; Bowel sounds present, no organomegaly  EXTREMITIES:  2+ Peripheral Pulses, No clubbing, cyanosis, or edema  PSYCH: AAOx3, normal affect, normal behavior   NEUROLOGY: non-focal, cranial nerves intact  SKIN: Normal color, No rashes or lesions  MUSCULOSKELETAL: + straight leg raise - R leg, no spinal or paraspinal tenderness, no joint swelling    LABS:                        13.8   3.90  )-----------( 187      ( 06 Nov 2018 17:27 )             42.2     11-08    141  |  102  |  20  ----------------------------<  97  3.7   |  29  |  0.83    Ca    9.2      08 Nov 2018 05:24  Mg     2.3     11-08    TPro  6.6  /  Alb  3.7  /  TBili  0.4  /  DBili  x   /  AST  19  /  ALT  19  /  AlkPhos  74  11-08                All consultant(s) notes reviewed and care discussed with other providers    Contact Number, Dr Velasco [phone]

## 2018-11-08 NOTE — CONSULT NOTE ADULT - SUBJECTIVE AND OBJECTIVE BOX
Chief Complaint:    HPI:  83 yo F with h/o HTN, HLD, chronic back pain, sciatica presenting with R buttock pain x 1 week.  Pt states that she has sharp 10/10 pain in right buttock that shoots down her R leg to her calf, worse with movement and sitting, no alleviating factors. She has had this pain in the past a bout 1 year ago and had been given ?epidural shot and pain has been under control since then until last week. She states that this pain is different from her chronic back pain which is midline lumbar pain and tolerable with her home pain regimen. She was seen by her orthopedic surgeon (Dr Morales) 5 days ago and was told this was likely sciatica. MRI was ordered (scheduled in 2 days) and she was referred to pain management (appt scheduled for 11/28). She was also prescribed gabapentin for her pain. Since the then pain has gotten worse and pt has difficulty ambulating due to the pain so she came to the ED.      In ED pt was given vallium 2mg PO x2, morphine 4mg IV x2, Tylenol PO, oxydocodone 5mg PO and percocet   VS:  133/75  62  97.1  17  99% on RA (06 Nov 2018 23:36)      PAST MEDICAL & SURGICAL HISTORY:  Colon cancer  Hyperlipidemia  Obesity (BMI 30.0-34.9)  Goiter  Essential hypertension  S/P knee surgery: left  S/P shoulder surgery: right  S/P hernia repair: Abdominal ( 1999 or 2000 )  S/P colon resection: 1998  S/P thyroid surgery: For Goiter ( 1959 )      FAMILY HISTORY:  No pertinent family history in first degree relatives      SOCIAL HISTORY:  [ ] Denies Smoking, Alcohol, or Drug Use    Allergies    No Known Allergies    Intolerances        PAIN MEDICATIONS:  gabapentin 300 milliGRAM(s) Oral every 8 hours  morphine  - Injectable 4 milliGRAM(s) IV Push every 4 hours PRN  oxyCODONE    5 mG/acetaminophen 325 mG 1 Tablet(s) Oral every 4 hours PRN      Heme:  aspirin enteric coated 81 milliGRAM(s) Oral daily  heparin  Injectable 5000 Unit(s) SubCutaneous every 8 hours    Antibiotics:    Cardiovascular:  amLODIPine   Tablet 10 milliGRAM(s) Oral daily    GI:  pantoprazole    Tablet 40 milliGRAM(s) Oral before breakfast    Endocrine:  simvastatin 5 milliGRAM(s) Oral at bedtime    All Other Medications:  calcium carbonate 1250 mG  + Vitamin D (OsCal 500 + D) 1 Tablet(s) Oral daily  cyanocobalamin 1000 MICROGram(s) Oral daily      REVIEW OF SYSTEMS:    CONSTITUTIONAL: No fever, weight loss, or fatigue  EYES: No eye pain, visual disturbances, or discharge  ENMT:  No difficulty hearing, tinnitus, vertigo; No sinus or throat pain  NECK: No pain or stiffness        Vital Signs Last 24 Hrs  T(C): 36.7 (08 Nov 2018 12:22), Max: 36.9 (07 Nov 2018 21:55)  T(F): 98.1 (08 Nov 2018 12:22), Max: 98.5 (07 Nov 2018 21:55)  HR: 70 (08 Nov 2018 12:22) (56 - 70)  BP: 112/68 (08 Nov 2018 12:22) (112/68 - 136/71)  BP(mean): --  RR: 18 (08 Nov 2018 12:22) (18 - 18)  SpO2: 96% (08 Nov 2018 12:22) (96% - 99%)    PAIN SCORE:         SCALE USED: (1-10 VNRS)             PHYSICAL EXAM:    GENERAL: NAD, well-groomed, well-developed  HEAD:  Atraumatic, Normocephalic  EYES: EOMI, PERRLA, conjunctiva and sclera clear  ENMT: No tonsillar erythema, exudates, or enlargement; Moist mucous membranes, Good dentition, No lesions        LABS:                          13.8   3.90  )-----------( 187      ( 06 Nov 2018 17:27 )             42.2     11-08    141  |  102  |  20  ----------------------------<  97  3.7   |  29  |  0.83    Ca    9.2      08 Nov 2018 05:24  Mg     2.3     11-08    TPro  6.6  /  Alb  3.7  /  TBili  0.4  /  DBili  x   /  AST  19  /  ALT  19  /  AlkPhos  74  11-08        Subjective: "I have now pain right now, it's when I'm moving it hurts (8/10). The pain is in my right hip and it shoots down my leg and prevents me from laying on my right side or putting any weight on my right hip. The medication I'm taking now for the pain is ok but the pain is still there. I was taking gabapentin at home but I don't remember the dosage."    Objective: Pt. A&Ox3, NAD laying in bed on left side. Not able to move freely in the bed. Pt. answers all questions appropriately and maintains eye contact.       RECOMMENDATIONS  1. Add an NSAID of choice standing for 2days then PRN afterwards.   2. Increase gabapentin to 40mg TID  3. Add Tizanidine 1mg Q6hr PRN. Hold for oversedation.  4. Request ortho consult   5. Consider lidoderm patch on right hip.     [ ]  [place]  Reviewed and Copied to Chart

## 2018-11-08 NOTE — CONSULT NOTE ADULT - SUBJECTIVE AND OBJECTIVE BOX
81 yo F with History of HTN, HLD, chronic back pain, sciatica presenting with R buttock pain x 1 week.  Patient states the pain was minimal at first then progressively worsened over the course of 1 week where she was unable to ambulate without severe pain. Denies any bladder / bowel dysfunction, parasthesias, numbness, tingling. States she has pain radiating from lower back to the right buttovk hip region. Densies any falls, tramas, injury to the back. States she was seen by her orthopedic surgeon Dr Morales from [facility] 1-2 weeks ago and was told this was likely sciatica. Outpatient MRI was ordered and she was placed on gabapentin. States was unable to get scheduled MRI as she was having severe pain and presented to the Emergency Room.     Allergies    No Known Allergies    Intolerances                            13.8   3.90  )-----------( 187      ( 06 Nov 2018 17:27 )             42.2     08 Nov 2018 05:24    141    |  102    |  20     ----------------------------<  97     3.7     |  29     |  0.83     Ca    9.2        08 Nov 2018 05:24  Mg     2.3       08 Nov 2018 05:24    TPro  6.6    /  Alb  3.7    /  TBili  0.4    /  DBili  x      /  AST  19     /  ALT  19     /  AlkPhos  74     08 Nov 2018 05:24      Vital Signs Last 24 Hrs  T(C): 36.7 (11-08-18 @ 12:22), Max: 36.9 (11-07-18 @ 21:55)  T(F): 98.1 (11-08-18 @ 12:22), Max: 98.5 (11-07-18 @ 21:55)  HR: 70 (11-08-18 @ 12:22) (56 - 70)  BP: 112/68 (11-08-18 @ 12:22) (112/68 - 136/71)  BP(mean): --  RR: 18 (11-08-18 @ 12:22) (18 - 18)  SpO2: 96% (11-08-18 @ 12:22) (96% - 99%)        Physical Exam  Gen: NAD  BL LE: Left Lower Extremity 5/5 EHL/FHL/TA/GS, 5/5 Right Lower Extremity EHL/FHL/TA/GS.  4/5 Right lower extremity Quad /Hamstrings. 5/5 Left  Lower Extremity Quads/Hamstring. Able to SLR past 30 degrees on left, difficulty with SLR on the Right. Sensation is grossly intact to light touch in the bilateral distal extremity.       MR Lumbar Spine No Cont (11.08.18 @ 02:40) >  IMPRESSION:  Multilevel degenerative spondylosis, increased from 2009.  Likely insufficiency fracture of L4 with superior concavity of the L4   endplate associated with marrow edema.  Abnormal intervertebral disc space signal at T10-T11 associated marrow   edema. Findings may represent degenerative change however, if there is   clinical concern for discitis osteomyelitis, contrast-enhanced MR imaging   of the thoracic spine may be done for further evaluation.      A/P: 82y Female with L4 Insufficiency Fracture  - LSO Brace   - Pain control/ Analgesia  - PT/OT  - Monitor motor and sensory exam   - Will discuss care with Dr Iqbal and update with any further recommendations  - Call ortho with any questions or concerns.

## 2018-11-08 NOTE — PROGRESS NOTE ADULT - PROBLEM SELECTOR PLAN 1
ortho spine evaluation pending   will continue pain meds  pain management evaluation  MRI LS spine noted  suspicious T10/T11 lesion  check ESR/CRP  ID evaluation   may need repeat MRI with contrast

## 2018-11-08 NOTE — CONSULT NOTE ADULT - SUBJECTIVE AND OBJECTIVE BOX
HPI:  81 yo F with h/o HTN, HLD, chronic back pain, sciatica presenting with R buttock pain x 1 week.     The patient states that she developed pain to her right buttock last Wednesday.  On Thursday and Friday, the pain became more intense. It eventually increased to a 10/10.   The pain was described as sharp. It radiated down her leg. worse with pressure or movement.     She has had this pain in the past a bout 1 year ago and had been given ?epidural shot and pain has been under control since then until last week.    She was seen by her orthopedic surgeon (Dr Morales) 5 days ago and was told this was likely sciatica. MRI was ordered (scheduled in 2 days) and she was referred to pain management (appt scheduled for ).    She denies any trauma. she denies any associated fever. No febrile illness in over 6 months.    She denies any weight loss.        PAST MEDICAL & SURGICAL HISTORY:  Colon cancer  -s/p surgery and chemotherpay in   Hyperlipidemia  Obesity (BMI 30.0-34.9)  Goiter  Essential hypertension  S/P knee surgery: left  S/P shoulder surgery: right  S/P hernia repair: Abdominal (  or  )  S/P colon resection:   S/P thyroid surgery: For Goiter (  )      Allergies    No Known Allergies    Intolerances        ANTIMICROBIALS:      OTHER MEDS:  amLODIPine   Tablet 10 milliGRAM(s) Oral daily  aspirin enteric coated 81 milliGRAM(s) Oral daily  calcium carbonate 1250 mG  + Vitamin D (OsCal 500 + D) 1 Tablet(s) Oral daily  cyanocobalamin 1000 MICROGram(s) Oral daily  gabapentin 300 milliGRAM(s) Oral every 8 hours  heparin  Injectable 5000 Unit(s) SubCutaneous every 8 hours  morphine  - Injectable 4 milliGRAM(s) IV Push every 4 hours PRN  oxyCODONE    5 mG/acetaminophen 325 mG 1 Tablet(s) Oral every 4 hours PRN  pantoprazole    Tablet 40 milliGRAM(s) Oral before breakfast  polyethylene glycol 3350 17 Gram(s) Oral daily  senna 2 Tablet(s) Oral at bedtime  simvastatin 5 milliGRAM(s) Oral at bedtime      SOCIAL HISTORY:    Lives in [place], from [place]  No tobacco  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      FAMILY HISTORY:  No pertinent family history in first degree relatives  Mother  at 95 of old age  Father  in 60s of lung cancer    REVIEW OF SYSTEMS  [  ] ROS unobtainable because:    [ x ] All other systems negative except as noted below:	    Constitutional:  [ ] fever [ ] chills  [ ] weight loss  [ ] weakness  Skin:  [ ] rash [ ] phlebitis	  Eyes: [ ] icterus [ ] pain  [ ] discharge	  ENMT: [ ] sore throat  [ ] thrush [ ] ulcers [ ] exudates  Respiratory: [ ] dyspnea [ ] hemoptysis [ ] cough [ ] sputum	  Cardiovascular:  [ ] chest pain [ ] palpitations [ ] edema	  Gastrointestinal:  [ ] nausea [ ] vomiting [ ] diarrhea [ ] constipation [ ] pain	  Genitourinary:  [ ] dysuria [ ] frequency [ ] hematuria [ ] discharge [ ] flank pain  [ ] incontinence  Musculoskeletal:  [ ] myalgias [ ] arthralgias [ ] arthritis  [ x] back pain  Neurological:  [ ] headache [ ] seizures  [ ] confusion/altered mental status  Psychiatric:  [ ] anxiety [ ] depression	  Hematology/Lymphatics:  [ ] lymphadenopathy  Endocrine:  [ ] adrenal [ ] thyroid  Allergic/Immunologic:	 [ ] transplant [ ] seasonal    PHYSICAL EXAM:  General: [x ] non-toxic  HEAD/EYES: [ ] PERRL [x ] white sclera [ ] icterus  ENT:  [ ] normal [x ] supple [ ] thrush [ ] pharyngeal exudate  Cardiovascular:   [ ] murmur [x ] normal [ ] PPM/AICD  Respiratory:  [x ] clear to ausculation bilaterally  GI:  x[ ] soft, non-tender, normal bowel sounds  :  [ ] carballo [ ] no CVA tenderness   Musculoskeletal:  [x ] pain with right leg raise  Neurologic:  [ x] non-focal exam   Skin:  [x ] no rash  Lymph: [x ] no lymphadenopathy  Psychiatric:  [x ] appropriate affect [ ] alert & oriented  Lines:  [x ] no phlebitis [ ] central line  x        Drug Dosing Weight  Height (cm): 160.02 (2018 01:11)  Weight (kg): 80.2 (2018 01:11)  BMI (kg/m2): 31.3 (2018 01:11)  BSA (m2): 1.84 (2018 01:11)    Vital Signs Last 24 Hrs  T(F): 98.1 (18 @ 12:22), Max: 98.6 (18 @ 13:24)    Vital Signs Last 24 Hrs  HR: 70 (18 @ 12:22) (56 - 70)  BP: 112/68 (18 @ 12:22) (112/68 - 136/71)  RR: 18 (18 @ 12:22)  SpO2: 96% (18 @ 12:22) (96% - 99%)  Wt(kg): --                          13.8   3.90  )-----------( 187      ( 2018 17:27 )             42.2           141  |  102  |  20  ----------------------------<  97  3.7   |  29  |  0.83    Ca    9.2      2018 05:24  Mg     2.3         TPro  6.6  /  Alb  3.7  /  TBili  0.4  /  DBili  x   /  AST  19  /  ALT  19  /  AlkPhos  74            MICROBIOLOGY:    RADIOLOGY:    < from: MR Lumbar Spine No Cont (18 @ 02:40) >  IMPRESSION:  Multilevel degenerative spondylosis, increased from .  Likely insufficiency fracture of L4 with superior concavity of the L4   endplate associated with marrow edema.  Abnormal intervertebral disc space signal at T10-T11 associated marrow   edema. Findings may represent degenerative change however, if there is   clinical concern for discitis osteomyelitis, contrast-enhanced MR imaging   of the thoracic spine may be done for further evaluation.    < end of copied text >

## 2018-11-08 NOTE — CONSULT NOTE ADULT - ASSESSMENT
82 year old female presents with acute on chronic back pain.  Her pain and exam seems more c/w sciatica.    She has abnormal imaging of the spine that shows changes at T10-T11.  these changes would not correlate with the pain associated with her presentation.    She is not demonstrating signs or symptoms on infection.    Check blood cultures times two, check sed rate, Check CRP, check procalcitionin.    Consider contrast CT of the thoracic spine and spine surgery eval.    Infection seems less likely.  Observe off abx.

## 2018-11-09 LAB
ALBUMIN SERPL ELPH-MCNC: 3.5 G/DL — SIGNIFICANT CHANGE UP (ref 3.3–5)
ALP SERPL-CCNC: 69 U/L — SIGNIFICANT CHANGE UP (ref 40–120)
ALT FLD-CCNC: 20 U/L — SIGNIFICANT CHANGE UP (ref 4–33)
AST SERPL-CCNC: 24 U/L — SIGNIFICANT CHANGE UP (ref 4–32)
BASOPHILS # BLD AUTO: 0.03 K/UL — SIGNIFICANT CHANGE UP (ref 0–0.2)
BASOPHILS NFR BLD AUTO: 0.7 % — SIGNIFICANT CHANGE UP (ref 0–2)
BILIRUB SERPL-MCNC: 0.4 MG/DL — SIGNIFICANT CHANGE UP (ref 0.2–1.2)
BUN SERPL-MCNC: 15 MG/DL — SIGNIFICANT CHANGE UP (ref 7–23)
CALCIUM SERPL-MCNC: 8.6 MG/DL — SIGNIFICANT CHANGE UP (ref 8.4–10.5)
CHLORIDE SERPL-SCNC: 104 MMOL/L — SIGNIFICANT CHANGE UP (ref 98–107)
CO2 SERPL-SCNC: 29 MMOL/L — SIGNIFICANT CHANGE UP (ref 22–31)
CREAT SERPL-MCNC: 0.78 MG/DL — SIGNIFICANT CHANGE UP (ref 0.5–1.3)
CRP SERPL-MCNC: < 4 MG/L — SIGNIFICANT CHANGE UP
EOSINOPHIL # BLD AUTO: 0.36 K/UL — SIGNIFICANT CHANGE UP (ref 0–0.5)
EOSINOPHIL NFR BLD AUTO: 8.8 % — HIGH (ref 0–6)
ERYTHROCYTE [SEDIMENTATION RATE] IN BLOOD: 12 MM/HR — SIGNIFICANT CHANGE UP (ref 4–25)
GLUCOSE SERPL-MCNC: 88 MG/DL — SIGNIFICANT CHANGE UP (ref 70–99)
HCT VFR BLD CALC: 40.2 % — SIGNIFICANT CHANGE UP (ref 34.5–45)
HGB BLD-MCNC: 13 G/DL — SIGNIFICANT CHANGE UP (ref 11.5–15.5)
IMM GRANULOCYTES # BLD AUTO: 0.01 # — SIGNIFICANT CHANGE UP
IMM GRANULOCYTES NFR BLD AUTO: 0.2 % — SIGNIFICANT CHANGE UP (ref 0–1.5)
LYMPHOCYTES # BLD AUTO: 1.13 K/UL — SIGNIFICANT CHANGE UP (ref 1–3.3)
LYMPHOCYTES # BLD AUTO: 27.6 % — SIGNIFICANT CHANGE UP (ref 13–44)
MAGNESIUM SERPL-MCNC: 2.3 MG/DL — SIGNIFICANT CHANGE UP (ref 1.6–2.6)
MCHC RBC-ENTMCNC: 29 PG — SIGNIFICANT CHANGE UP (ref 27–34)
MCHC RBC-ENTMCNC: 32.3 % — SIGNIFICANT CHANGE UP (ref 32–36)
MCV RBC AUTO: 89.7 FL — SIGNIFICANT CHANGE UP (ref 80–100)
MONOCYTES # BLD AUTO: 0.46 K/UL — SIGNIFICANT CHANGE UP (ref 0–0.9)
MONOCYTES NFR BLD AUTO: 11.2 % — SIGNIFICANT CHANGE UP (ref 2–14)
NEUTROPHILS # BLD AUTO: 2.11 K/UL — SIGNIFICANT CHANGE UP (ref 1.8–7.4)
NEUTROPHILS NFR BLD AUTO: 51.5 % — SIGNIFICANT CHANGE UP (ref 43–77)
NRBC # FLD: 0 — SIGNIFICANT CHANGE UP
PLATELET # BLD AUTO: 160 K/UL — SIGNIFICANT CHANGE UP (ref 150–400)
PMV BLD: 10 FL — SIGNIFICANT CHANGE UP (ref 7–13)
POTASSIUM SERPL-MCNC: 4 MMOL/L — SIGNIFICANT CHANGE UP (ref 3.5–5.3)
POTASSIUM SERPL-SCNC: 4 MMOL/L — SIGNIFICANT CHANGE UP (ref 3.5–5.3)
PROCALCITONIN SERPL-MCNC: 0.12 NG/ML — HIGH (ref 0.02–0.1)
PROT SERPL-MCNC: 6.3 G/DL — SIGNIFICANT CHANGE UP (ref 6–8.3)
RBC # BLD: 4.48 M/UL — SIGNIFICANT CHANGE UP (ref 3.8–5.2)
RBC # FLD: 13.7 % — SIGNIFICANT CHANGE UP (ref 10.3–14.5)
SODIUM SERPL-SCNC: 143 MMOL/L — SIGNIFICANT CHANGE UP (ref 135–145)
SPECIMEN SOURCE: SIGNIFICANT CHANGE UP
SPECIMEN SOURCE: SIGNIFICANT CHANGE UP
WBC # BLD: 4.1 K/UL — SIGNIFICANT CHANGE UP (ref 3.8–10.5)
WBC # FLD AUTO: 4.1 K/UL — SIGNIFICANT CHANGE UP (ref 3.8–10.5)

## 2018-11-09 PROCEDURE — 99232 SBSQ HOSP IP/OBS MODERATE 35: CPT

## 2018-11-09 RX ORDER — KETOROLAC TROMETHAMINE 30 MG/ML
30 SYRINGE (ML) INJECTION ONCE
Qty: 0 | Refills: 0 | Status: DISCONTINUED | OUTPATIENT
Start: 2018-11-09 | End: 2018-11-09

## 2018-11-09 RX ORDER — KETOROLAC TROMETHAMINE 30 MG/ML
15 SYRINGE (ML) INJECTION EVERY 8 HOURS
Qty: 0 | Refills: 0 | Status: DISCONTINUED | OUTPATIENT
Start: 2018-11-09 | End: 2018-11-10

## 2018-11-09 RX ADMIN — GABAPENTIN 300 MILLIGRAM(S): 400 CAPSULE ORAL at 19:43

## 2018-11-09 RX ADMIN — Medication 81 MILLIGRAM(S): at 11:34

## 2018-11-09 RX ADMIN — Medication 15 MILLIGRAM(S): at 20:09

## 2018-11-09 RX ADMIN — Medication 15 MILLIGRAM(S): at 20:30

## 2018-11-09 RX ADMIN — HEPARIN SODIUM 5000 UNIT(S): 5000 INJECTION INTRAVENOUS; SUBCUTANEOUS at 14:00

## 2018-11-09 RX ADMIN — HEPARIN SODIUM 5000 UNIT(S): 5000 INJECTION INTRAVENOUS; SUBCUTANEOUS at 05:33

## 2018-11-09 RX ADMIN — PREGABALIN 1000 MICROGRAM(S): 225 CAPSULE ORAL at 11:35

## 2018-11-09 RX ADMIN — GABAPENTIN 300 MILLIGRAM(S): 400 CAPSULE ORAL at 22:53

## 2018-11-09 RX ADMIN — PANTOPRAZOLE SODIUM 40 MILLIGRAM(S): 20 TABLET, DELAYED RELEASE ORAL at 05:33

## 2018-11-09 RX ADMIN — SIMVASTATIN 5 MILLIGRAM(S): 20 TABLET, FILM COATED ORAL at 22:53

## 2018-11-09 RX ADMIN — Medication 1 TABLET(S): at 11:34

## 2018-11-09 RX ADMIN — POLYETHYLENE GLYCOL 3350 17 GRAM(S): 17 POWDER, FOR SOLUTION ORAL at 11:35

## 2018-11-09 RX ADMIN — SENNA PLUS 2 TABLET(S): 8.6 TABLET ORAL at 22:53

## 2018-11-09 RX ADMIN — Medication 30 MILLIGRAM(S): at 11:50

## 2018-11-09 RX ADMIN — HEPARIN SODIUM 5000 UNIT(S): 5000 INJECTION INTRAVENOUS; SUBCUTANEOUS at 22:53

## 2018-11-09 RX ADMIN — GABAPENTIN 300 MILLIGRAM(S): 400 CAPSULE ORAL at 05:33

## 2018-11-09 RX ADMIN — AMLODIPINE BESYLATE 10 MILLIGRAM(S): 2.5 TABLET ORAL at 05:33

## 2018-11-09 RX ADMIN — Medication 30 MILLIGRAM(S): at 11:34

## 2018-11-09 NOTE — PROGRESS NOTE ADULT - PROBLEM SELECTOR PLAN 1
slowly improving  continue PT  Start Toradol q 8 x 3 doses  d/w ID and ortho  extremely unlikely to be infectious  ESR normal  Brace per spine  discharge home tomorrow with PT

## 2018-11-09 NOTE — PROGRESS NOTE ADULT - SUBJECTIVE AND OBJECTIVE BOX
Patient is a 82y old  Female who presents with a chief complaint of R buttock pain/sciatica (08 Nov 2018 16:48)      SUBJECTIVE / OVERNIGHT EVENTS: no overnight events    ROS:  Resp: No cough no sputum production  CVS: No chest pain no palpitations no orthopnea  GI: no N/V/D  : no dysuria, no hematuria  Neuro: no weakness no paresthesias  Heme: No petechiae no easy bruising  Msk: No joint pain no swelling  Skin: No rash no itching        MEDICATIONS  (STANDING):  amLODIPine   Tablet 10 milliGRAM(s) Oral daily  aspirin enteric coated 81 milliGRAM(s) Oral daily  calcium carbonate 1250 mG  + Vitamin D (OsCal 500 + D) 1 Tablet(s) Oral daily  cyanocobalamin 1000 MICROGram(s) Oral daily  gabapentin 300 milliGRAM(s) Oral every 8 hours  heparin  Injectable 5000 Unit(s) SubCutaneous every 8 hours  ketorolac   Injectable 30 milliGRAM(s) IV Push once  ketorolac   Injectable 15 milliGRAM(s) IV Push every 8 hours  pantoprazole    Tablet 40 milliGRAM(s) Oral before breakfast  polyethylene glycol 3350 17 Gram(s) Oral daily  senna 2 Tablet(s) Oral at bedtime  simvastatin 5 milliGRAM(s) Oral at bedtime    MEDICATIONS  (PRN):  morphine  - Injectable 4 milliGRAM(s) IV Push every 4 hours PRN Severe Pain (7 - 10)  oxyCODONE    5 mG/acetaminophen 325 mG 1 Tablet(s) Oral every 4 hours PRN Moderate Pain (4 - 6)        CAPILLARY BLOOD GLUCOSE        I&O's Summary      Vital Signs Last 24 Hrs  T(C): 36.8 (09 Nov 2018 05:31), Max: 36.9 (08 Nov 2018 22:38)  T(F): 98.2 (09 Nov 2018 05:31), Max: 98.4 (08 Nov 2018 22:38)  HR: 60 (09 Nov 2018 05:31) (60 - 70)  BP: 114/67 (09 Nov 2018 05:31) (112/68 - 137/76)  BP(mean): --  RR: 18 (09 Nov 2018 05:31) (18 - 18)  SpO2: 98% (09 Nov 2018 05:31) (96% - 98%)    PHYSICAL EXAM:    GENERAL: No acute distress, well-developed  HEAD:  Atraumatic, Normocephalic  ENT: EOMI, PERRL, conjunctiva and sclera clear, Neck supple, moist mucosa  CHEST/LUNG: Clear to auscultation bilaterally; No wheeze, equal breath sounds bilaterally   HEART: Regular rate and rhythm; No murmurs, rubs, or gallops  ABDOMEN: Soft, Nontender, Nondistended; Bowel sounds present, no organomegaly  EXTREMITIES:  2+ Peripheral Pulses, No clubbing, cyanosis, or edema  PSYCH: AAOx3, normal affect, normal behavior   NEUROLOGY: non-focal, cranial nerves intact  SKIN: Normal color, No rashes or lesions  MUSCULOSKELETAL: + straight leg raise - R leg, no spinal or paraspinal tenderness, no joint swelling    LABS:                        13.0   4.10  )-----------( 160      ( 09 Nov 2018 06:27 )             40.2     11-09    143  |  104  |  15  ----------------------------<  88  4.0   |  29  |  0.78    Ca    8.6      09 Nov 2018 06:27  Mg     2.3     11-09    TPro  6.3  /  Alb  3.5  /  TBili  0.4  /  DBili  x   /  AST  24  /  ALT  20  /  AlkPhos  69  11-09                All consultant(s) notes reviewed and care discussed with other providers    Contact Number, Dr Velasco [phone]

## 2018-11-09 NOTE — PROGRESS NOTE ADULT - ASSESSMENT
82 year old female presents with acute on chronic back pain.  Her pain and exam seems more c/w sciatica.    She has abnormal imaging of the spine that shows changes at T10-T11.  these changes would not correlate with the pain associated with her presentation.    She is not demonstrating signs or symptoms on infection.    CRP/ Sed rate/ procalcitonin not suggestive of bacterial process.     Observe off abx.    Seen by spine surgery. Not high concern for infection    Follow up with surgery as an outpt to consider repeat imaging in about 2 months.    Follow up immediately with any fever/ increased pain    Consider contrast CT of the thoracic spine and spine surgery eval.

## 2018-11-10 VITALS
OXYGEN SATURATION: 96 % | TEMPERATURE: 98 F | DIASTOLIC BLOOD PRESSURE: 61 MMHG | SYSTOLIC BLOOD PRESSURE: 105 MMHG | HEART RATE: 72 BPM | RESPIRATION RATE: 18 BRPM

## 2018-11-10 RX ORDER — POLYETHYLENE GLYCOL 3350 17 G/17G
17 POWDER, FOR SOLUTION ORAL
Qty: 0 | Refills: 0 | COMMUNITY
Start: 2018-11-10

## 2018-11-10 RX ORDER — PANTOPRAZOLE SODIUM 20 MG/1
1 TABLET, DELAYED RELEASE ORAL
Qty: 30 | Refills: 0 | OUTPATIENT
Start: 2018-11-10 | End: 2018-12-09

## 2018-11-10 RX ORDER — SENNA PLUS 8.6 MG/1
2 TABLET ORAL
Qty: 0 | Refills: 0 | COMMUNITY
Start: 2018-11-10

## 2018-11-10 RX ORDER — DICLOFENAC SODIUM 30 MG/G
1 GEL TOPICAL
Qty: 0 | Refills: 0 | COMMUNITY

## 2018-11-10 RX ADMIN — Medication 15 MILLIGRAM(S): at 05:08

## 2018-11-10 RX ADMIN — HEPARIN SODIUM 5000 UNIT(S): 5000 INJECTION INTRAVENOUS; SUBCUTANEOUS at 05:07

## 2018-11-10 RX ADMIN — PANTOPRAZOLE SODIUM 40 MILLIGRAM(S): 20 TABLET, DELAYED RELEASE ORAL at 05:07

## 2018-11-10 RX ADMIN — HEPARIN SODIUM 5000 UNIT(S): 5000 INJECTION INTRAVENOUS; SUBCUTANEOUS at 14:08

## 2018-11-10 RX ADMIN — AMLODIPINE BESYLATE 10 MILLIGRAM(S): 2.5 TABLET ORAL at 05:07

## 2018-11-10 RX ADMIN — Medication 15 MILLIGRAM(S): at 04:34

## 2018-11-10 RX ADMIN — GABAPENTIN 300 MILLIGRAM(S): 400 CAPSULE ORAL at 14:06

## 2018-11-10 RX ADMIN — GABAPENTIN 300 MILLIGRAM(S): 400 CAPSULE ORAL at 05:07

## 2018-11-10 RX ADMIN — Medication 15 MILLIGRAM(S): at 14:07

## 2018-11-10 RX ADMIN — Medication 81 MILLIGRAM(S): at 14:06

## 2018-11-10 RX ADMIN — Medication 1 TABLET(S): at 14:06

## 2018-11-10 RX ADMIN — Medication 15 MILLIGRAM(S): at 14:09

## 2018-11-10 RX ADMIN — PREGABALIN 1000 MICROGRAM(S): 225 CAPSULE ORAL at 14:07

## 2018-11-10 NOTE — PROGRESS NOTE ADULT - PROBLEM SELECTOR PLAN 1
improved  s/p Toradol  states was independent with ambulation today with cane/walker  discharge home Naproxen x 2 days then follow up with Dr Iqbal as outpatient  pain management with Tylenol  patient has not needed any opioid since the 6th

## 2018-11-10 NOTE — PROGRESS NOTE ADULT - SUBJECTIVE AND OBJECTIVE BOX
Patient is a 82y old  Female who presents with a chief complaint of R buttock pain/sciatica (09 Nov 2018 13:08)    SUBJECTIVE / OVERNIGHT EVENTS: no overnight events  improved overnight     ROS:  Resp: No cough no sputum production  CVS: No chest pain no palpitations no orthopnea  GI: no N/V/D  : no dysuria, no hematuria  Neuro: no weakness no paresthesias  Heme: No petechiae no easy bruising  Msk: right sciatica pain   Skin: No rash no itching        MEDICATIONS  (STANDING):  amLODIPine   Tablet 10 milliGRAM(s) Oral daily  aspirin enteric coated 81 milliGRAM(s) Oral daily  calcium carbonate 1250 mG  + Vitamin D (OsCal 500 + D) 1 Tablet(s) Oral daily  cyanocobalamin 1000 MICROGram(s) Oral daily  gabapentin 300 milliGRAM(s) Oral every 8 hours  heparin  Injectable 5000 Unit(s) SubCutaneous every 8 hours  ketorolac   Injectable 15 milliGRAM(s) IV Push every 8 hours  pantoprazole    Tablet 40 milliGRAM(s) Oral before breakfast  polyethylene glycol 3350 17 Gram(s) Oral daily  senna 2 Tablet(s) Oral at bedtime  simvastatin 5 milliGRAM(s) Oral at bedtime    MEDICATIONS  (PRN):  morphine  - Injectable 4 milliGRAM(s) IV Push every 4 hours PRN Severe Pain (7 - 10)  oxyCODONE    5 mG/acetaminophen 325 mG 1 Tablet(s) Oral every 4 hours PRN Moderate Pain (4 - 6)        CAPILLARY BLOOD GLUCOSE        I&O's Summary      Vital Signs Last 24 Hrs  T(C): 36.6 (10 Nov 2018 05:04), Max: 36.7 (09 Nov 2018 14:31)  T(F): 97.8 (10 Nov 2018 05:04), Max: 98.1 (09 Nov 2018 22:23)  HR: 62 (10 Nov 2018 05:04) (62 - 77)  BP: 140/- (10 Nov 2018 05:04) (123/65 - 140/-)  BP(mean): 85 (10 Nov 2018 05:04) (85 - 85)  RR: 18 (10 Nov 2018 05:04) (18 - 18)  SpO2: 99% (10 Nov 2018 05:04) (96% - 99%)    PHYSICAL EXAM:  GENERAL: No acute distress, well-developed  HEAD:  Atraumatic, Normocephalic  ENT: EOMI, PERRL, conjunctiva and sclera clear, Neck supple, moist mucosa  CHEST/LUNG: Clear to auscultation bilaterally; No wheeze, equal breath sounds bilaterally   HEART: Regular rate and rhythm; No murmurs, rubs, or gallops  ABDOMEN: Soft, Nontender, Nondistended; Bowel sounds present, no organomegaly  EXTREMITIES:  2+ Peripheral Pulses, No clubbing, cyanosis, or edema  PSYCH: AAOx3, normal affect, normal behavior   NEUROLOGY: non-focal, cranial nerves intact  SKIN: Normal color, No rashes or lesions  MUSCULOSKELETAL: + straight leg raise but ROM improved R leg, no spinal or paraspinal tenderness, no joint swelling    LABS:                        13.0   4.10  )-----------( 160      ( 09 Nov 2018 06:27 )             40.2     11-09    143  |  104  |  15  ----------------------------<  88  4.0   |  29  |  0.78    Ca    8.6      09 Nov 2018 06:27  Mg     2.3     11-09    TPro  6.3  /  Alb  3.5  /  TBili  0.4  /  DBili  x   /  AST  24  /  ALT  20  /  AlkPhos  69  11-09                All consultant(s) notes reviewed and care discussed with other providers    Contact Number, Dr Velasco [phone]

## 2018-11-10 NOTE — PROGRESS NOTE ADULT - REASON FOR ADMISSION
R buttock pain/sciatica

## 2018-11-12 ENCOUNTER — INBOUND DOCUMENT (OUTPATIENT)
Age: 83
End: 2018-11-12

## 2018-11-13 LAB
BACTERIA BLD CULT: SIGNIFICANT CHANGE UP
BACTERIA BLD CULT: SIGNIFICANT CHANGE UP

## 2018-11-16 ENCOUNTER — INBOUND DOCUMENT (OUTPATIENT)
Age: 83
End: 2018-11-16

## 2018-11-19 ENCOUNTER — APPOINTMENT (OUTPATIENT)
Dept: ORTHOPEDIC SURGERY | Facility: CLINIC | Age: 83
End: 2018-11-19
Payer: MEDICARE

## 2018-11-19 VITALS
WEIGHT: 170 LBS | BODY MASS INDEX: 30.12 KG/M2 | HEART RATE: 61 BPM | SYSTOLIC BLOOD PRESSURE: 133 MMHG | DIASTOLIC BLOOD PRESSURE: 77 MMHG | HEIGHT: 63 IN

## 2018-11-19 DIAGNOSIS — S32.009A UNSPECIFIED FRACTURE OF UNSPECIFIED LUMBAR VERTEBRA, INITIAL ENCOUNTER FOR CLOSED FRACTURE: ICD-10-CM

## 2018-11-19 PROCEDURE — 99214 OFFICE O/P EST MOD 30 MIN: CPT

## 2019-11-18 ENCOUNTER — APPOINTMENT (OUTPATIENT)
Dept: ORTHOPEDIC SURGERY | Facility: CLINIC | Age: 84
End: 2019-11-18

## 2019-11-18 NOTE — HISTORY OF PRESENT ILLNESS
[de-identified] : Patient is here for right shoulder pain\par \par The patient's past medical history, past surgical history, medications and allergies were reviewed by me today and documented accordingly. In addition, the patient's family and social history, which were noncontributory to this visit, were reviewed also. The patient has no family history of arthritis.

## 2019-11-19 ENCOUNTER — APPOINTMENT (OUTPATIENT)
Dept: ORTHOPEDIC SURGERY | Facility: CLINIC | Age: 84
End: 2019-11-19

## 2020-02-06 NOTE — PATIENT PROFILE ADULT - FUNCTIONAL SCREEN CURRENT LEVEL: EATING, MLM
02/05/20 2300 Dual Skin Pressure Injury Assessment Dual Skin Pressure Injury Assessment WDL Second Care Provider (Based on [address] [place] [address]) Ruthe Charm Skin Integumentary Skin Integumentary (WDL) X Skin Color Appropriate for ethnicity Skin Condition/Temp Warm;Dry Skin Integrity Abrasion;Scars (comment) (jessi arms abrasion from fall at home) 0 = independent

## 2021-08-20 ENCOUNTER — EMERGENCY (EMERGENCY)
Facility: HOSPITAL | Age: 86
LOS: 0 days | Discharge: HOME | End: 2021-08-20
Attending: EMERGENCY MEDICINE
Payer: MEDICARE

## 2021-08-20 VITALS
WEIGHT: 164.91 LBS | SYSTOLIC BLOOD PRESSURE: 175 MMHG | HEART RATE: 65 BPM | OXYGEN SATURATION: 99 % | HEIGHT: 63 IN | TEMPERATURE: 99 F | DIASTOLIC BLOOD PRESSURE: 79 MMHG | RESPIRATION RATE: 20 BRPM

## 2021-08-20 DIAGNOSIS — Z98.89 OTHER SPECIFIED POSTPROCEDURAL STATES: Chronic | ICD-10-CM

## 2021-08-20 DIAGNOSIS — I10 ESSENTIAL (PRIMARY) HYPERTENSION: ICD-10-CM

## 2021-08-20 DIAGNOSIS — R51.9 HEADACHE, UNSPECIFIED: ICD-10-CM

## 2021-08-20 DIAGNOSIS — Z85.038 PERSONAL HISTORY OF OTHER MALIGNANT NEOPLASM OF LARGE INTESTINE: ICD-10-CM

## 2021-08-20 DIAGNOSIS — R07.0 PAIN IN THROAT: ICD-10-CM

## 2021-08-20 DIAGNOSIS — E78.5 HYPERLIPIDEMIA, UNSPECIFIED: ICD-10-CM

## 2021-08-20 DIAGNOSIS — U07.1 COVID-19: ICD-10-CM

## 2021-08-20 DIAGNOSIS — R09.81 NASAL CONGESTION: ICD-10-CM

## 2021-08-20 LAB
FLUAV AG NPH QL: SIGNIFICANT CHANGE UP
FLUBV AG NPH QL: SIGNIFICANT CHANGE UP
SARS-COV-2 RNA SPEC QL NAA+PROBE: DETECTED

## 2021-08-20 PROCEDURE — 99284 EMERGENCY DEPT VISIT MOD MDM: CPT

## 2021-08-20 RX ORDER — ACETAMINOPHEN 500 MG
975 TABLET ORAL ONCE
Refills: 0 | Status: COMPLETED | OUTPATIENT
Start: 2021-08-20 | End: 2021-08-20

## 2021-08-20 RX ADMIN — Medication 975 MILLIGRAM(S): at 03:18

## 2021-08-20 NOTE — ED PROVIDER NOTE - NSICDXPASTMEDICALHX_GEN_ALL_CORE_FT
PAST MEDICAL HISTORY:  Colon cancer     Essential hypertension     Goiter     Hyperlipidemia     Obesity (BMI 30.0-34.9)

## 2021-08-20 NOTE — ED PROVIDER NOTE - NSICDXPASTSURGICALHX_GEN_ALL_CORE_FT
PAST SURGICAL HISTORY:  S/P colon resection 1998    S/P hernia repair Abdominal ( 1999 or 2000 )    S/P knee surgery left    S/P shoulder surgery right    S/P thyroid surgery For Goiter ( 1959 )

## 2021-08-20 NOTE — ED ADULT NURSE NOTE - OBJECTIVE STATEMENT
Pt presents to the ED co A&Ox4 flu like symptoms. States she wants to get covid tested (  tested +covid) , c/o headache, runny nose and eyes. pt currently sounds congested but denies fevers, chills, sob, difficulty breathing.

## 2021-08-20 NOTE — ED PROVIDER NOTE - NSFOLLOWUPINSTRUCTIONS_ED_ALL_ED_FT
COVID-19 (Coronavirus Disease 2019)    WHAT YOU NEED TO KNOW:    What do I need to know about coronavirus disease 2019 (COVID-19)? COVID-19 is the disease caused by a coronavirus first discovered in 2019. Coronaviruses generally cause upper respiratory (nose, throat, and lung) infections, such as a cold. The new virus spreads quickly and easily. The virus can be spread starting 2 days before symptoms even begin. The virus has also changed into several new forms (called variants) since it was discovered. The variants may be more contagious (easily spread) than the original form. Some may also cause more severe illness than others. It is important to follow local, national, and worldwide measures to protect yourself and others from infection.    What are the signs and symptoms of COVID-19? You may not develop any signs or symptoms. Signs and symptoms usually start about 5 days after infection but can take 2 to 14 days. You may feel like you have the flu or a bad cold. Some signs and symptoms go away in a few days. Others can last weeks, months, or possibly years. Information on COVID-19 is still being learned. Tell your healthcare provider if you think you were infected but develop signs or symptoms not listed below:  •A cough      •Shortness of breath or trouble breathing that may become severe      •A fever of at least 100.4°F, or 38°C (may be lower in adults 65 or older)      •Chills that might include shaking      •Muscle pain, body aches, or a headache      •A sore throat      •Suddenly not being able to taste or smell anything      •Feeling mentally and physically tired (fatigue)      •Congestion (stuffy head and nose), or a runny nose      •Diarrhea, nausea, or vomiting      How is COVID-19 diagnosed? If you think you have COVID-19, call your healthcare provider. He or she will tell you what to do based on your symptoms and testing guidelines in your area. In general, the following may be used:   •A viral test shows if you have a current infection. Samples are taken from your nose and throat, usually with swabs. You may need to wait several days to get the test results. Your healthcare provider will tell you how to get your results. You will need to quarantine (stay physically away from others) until you get your results. If results show you have COVID-19, you will need to continue until you are well. Your provider or other health official may give you more directions. You will also need to prevent another infection until it is known if you can get COVID-19 again.      •An antibody test shows if you had a past infection. Blood samples are used for this test. Antibodies are made by your immune system to attack the virus that causes COVID-19. Antibodies will form 1 to 3 weeks after you are infected. It is not known if antibodies prevent a second infection, or for how long a person might be protected. If you have antibodies, you will still need to be careful around others until more is known.      •CT scans or x-rays may be used to check for signs of pneumonia. The 2019 coronavirus causes a specific kind of pneumonia, usually in both lungs. The pictures may also be used to check for health problems in other parts of your body.      How is COVID-19 treated? Treatment such as monoclonal antibodies and convalescent plasma have emergency use authorization (EUA). This means they may be given only to patients who are hospitalized with severe signs and symptoms. The following may be used to manage your symptoms:   •Mild symptoms may get better on their own. If you do not need to be treated in a hospital, you will be given instructions to use at home. Your condition will be closely monitored. You will need to watch for worsening symptoms and seek immediate care if needed. Talk to your healthcare provider about the following:?Decongestants help reduce nasal congestion and help you breathe more easily. If you take decongestant pills, they may make you feel restless or cause problems with your sleep. Do not use decongestant sprays for more than a few days.      ?Cough suppressants help reduce coughing. Ask your healthcare provider which type of cough medicine is best for you.      ?To soothe a sore throat, gargle with warm salt water, or use throat lozenges or a throat spray. Drink more liquids to thin and loosen mucus and to prevent dehydration.      ?NSAIDs or acetaminophen can help lower a fever and relieve body aches or a headache. Follow directions. If not taken correctly, NSAIDs can cause kidney damage and acetaminophen can cause liver damage.      •Severe or life-threatening symptoms are treated in the hospital. You may need a combination of the following:?Medicines may be given to lower or prevent inflammation or to fight the virus. You may also need blood thinners to prevent or treat blood clots. If you have a deep vein thrombosis (DVT) or pulmonary embolism (PE), you may need to keep using blood thinners for 3 months.      ?Extra oxygen may be given if you have respiratory failure. This means your lungs cannot get enough oxygen into your blood and out to your organs. Extra oxygen can help prevent organ failure.      ?A ventilator may be used to help you breathe.        What do I need to know about health problems the virus may cause? Serious health problems may improve or continue for weeks, months, and possibly years. Health problems that continue may be called long COVID. Anyone can develop serious problems from this virus, but your risk is higher if you are 65 or older. A weak immune system, diabetes, or a heart or lung condition can also increase your risk. Your risk is also higher if you are a current or former cigarette smoker. COVID-19 can lead to any of the following:  •Serious lower respiratory conditions, such as pneumonia or acute respiratory distress syndrome (ARDS)      •Blood vessel damage, leading to blood clots      •Organ damage from a lack of oxygen or from blood clots      •Sleep problems      •Problems thinking clearly, remembering information, or concentrating      •Mood changes, depression, or anxiety      How does the 2019 coronavirus spread? The following are ways the virus is thought to spread, but more information may be coming:   •Droplets are the main way all coronaviruses spread. The virus travels in droplets that form when a person talks, coughs, or sneezes. The droplets can also float in the air for minutes or hours. Infection happens when you breathe in the droplets or get them in your eyes or nose. Close personal contact with an infected person increases your risk for infection. This means being within 6 feet (2 meters) of the person for at least 15 minutes over 24 hours.      •Person-to-person contact can spread the virus. For example, a person with the virus on his or her hands can spread it by shaking hands with someone.      •The virus can stay on objects and surfaces for a short time. You may become infected by touching the object or surface and then touching your eyes or mouth.      •An infected animal may be able to infect a person who touches it. This may happen at live markets or on a farm.      How can I help lower the risk for COVID-19? The best way to prevent infection is to avoid anyone who is infected, but this can be hard to do. An infected person can spread the virus before signs or symptoms begin, or even if signs or symptoms never develop. The following can help lower the risk for infection:     Prevent COVID-19 Infection     •Wash your hands often throughout the day. Use soap and water. Rub your soapy hands together, lacing your fingers, for at least 20 seconds. Rinse with warm, running water. Dry your hands with a clean towel or paper towel. Use hand  that contains alcohol if soap and water are not available. Teach children how to wash their hands and use hand .  Handwashing           •Cover sneezes and coughs. Turn your face away and cover your mouth and nose with a tissue. Throw the tissue away. Use the bend of your arm if a tissue is not available. Then wash your hands well with soap and water or use hand . Teach children how to cover a cough or sneeze.      •Wear a face covering (mask) around anyone who does not live in your home. Use a cloth covering with at least 2 layers. You can also create layers by putting a cloth covering over a disposable non-medical mask. Cover your mouth and your nose. The covering should fit snugly against the bridge of your nose. Securely fasten it under your chin and on the sides of your face. Do not wear a plastic face shield instead of a covering. Continue social distancing and washing your hands often. A face covering is not a substitute for social distancing safety measures.  How to Wear a Face Covering (Mask)           •Follow worldwide, national, and local social distancing guidelines. Keep at least 6 feet (2 meters) between you and others. Also keep this distance from anyone who comes to your home, such as someone making a delivery. Wear a face covering while you are around others. You will need to wear a covering in restaurants, stores, and other public buildings. You will also need a covering on mass transit, such as a bus, subway, or airplane. Remember to use a covering made from thick material or wear 2 coverings together.      •Make a habit of not touching your face. If you get the virus on your hands, you can transfer it to your eyes, nose, or mouth and become infected. You can also transfer it to objects, surfaces, or people. Do not touch your eyes, nose, or mouth without washing your hands first.      •Clean and disinfect high-touch surfaces and objects often. Use disinfecting wipes, or make a solution of 4 teaspoons of bleach in 1 quart (4 cups) of water. Clean and disinfect even if you think no one living in or coming to your home is infected with the virus.      •Ask about vaccines you may need. Get a COVID-19 vaccine when it is available to you. The current vaccines are given as a shot in 1 or 2 doses. Get the influenza (flu) vaccine as soon as recommended each year, usually starting in September or October. Get the pneumonia vaccine if recommended.      How do I follow social distancing guidelines to help lower the risk for COVID-19? National and local social distancing rules vary. Rules may change over time as restrictions are lifted. Restrictions may return if an outbreak happens where you live. It is important to know and follow all current social distancing rules in your area. The following are general guidelines:  •Stay home if you are sick or think you may have COVID-19. It is important to stay home if you are waiting for a testing appointment or for test results. Even if you do not have symptoms, you can pass the virus to others.      •Limit trips out of your home. Have food, medicines, and other supplies delivered and left at your door or other area, if possible. Plan trips out of your home so you make the fewest stops possible to limit close personal contact. Keep track of places you go. This will help contact tracers notify others if you become infected.      •Avoid close physical contact with anyone who does not live in your home. Do not shake hands with, hug, or kiss a person as a greeting. If you must use public transportation (such as a bus or subway), try to sit or stand away from others. Only allow necessary people into your home. Wear your face covering, and remind others to wear a face covering. Remind them to wash their hands when they arrive and before they leave. Do not let someone into your home or go to someone's home just to visit. Even if you both do not feel sick, the virus can pass from one of you to the other.      •Avoid in-person gatherings and crowds. Gatherings or crowds of 10 or more individuals can cause the virus to spread. Avoid places such as [place], beaches, sporting events, and tourist attractions. For events such as parties, holiday meals, Church services, and conferences, attend virtually (on a computer), if possible.      •Ask your healthcare provider for other ways to have appointments. Some providers offer phone, video, or other types of appointments. You may also be able to get prescriptions for a few months of your medicines at a time.      •Stay safe if you must go out to work. Keep physical distance between you and other workers as much as possible. Follow your employer's rules so everyone stays safe.      What should I do if I have COVID-19 and am recovering at home? Healthcare providers will give you specific instructions to follow. The following are general guidelines to remind you how to keep others safe until you are well:   •Wash your hands often. Use soap and water as much as possible. Use hand  that contains alcohol if soap and water are not available. Dry your hands with a clean towel or paper towel. Do not share towels with anyone. If you use paper towels, throw them away in a lined trash can kept in your room or area. Use a covered trash can, if possible.      •Do not go out of your home unless it is necessary. Ask someone who is not infected to go out for groceries or supplies, or have them delivered. Do not go to your healthcare provider's office without an appointment.      •Only have close physical contact with a person giving direct care, or a baby or child you must care for. Family members and friends should not visit you. If possible, stay in a separate area or room of your home if you live with others. No one should go into the area or room except to give you care. You can visit with others by phone, video chat, e-mail, or similar systems.      •Wear a face covering while others are near you. This can help prevent droplets from spreading the virus when you talk, sneeze, or cough. Put the covering on before anyone comes into your room or area. Remind the person to cover his or her nose and mouth before coming in to provide care for you.      •Do not share items. Do not share dishes, towels, or other items with anyone. Items need to be washed after you use them.      •Protect your baby. Some newborns have tested positive for the virus. It is not known if they became infected before or after birth. The highest risk is when a  has close contact with an infected person. If you are pregnant or breastfeeding, talk to your healthcare provider or obstetrician about any concerns you have. He or she will tell you when to bring your baby in for check-ups and vaccines. He or she will also tell you what to do if you think your baby was infected with the coronavirus. Wash your hands and put on a clean face covering before you breastfeed or care for your baby.      •Do not handle live animals unless it is necessary. Some animals, including pets, have been infected with the new coronavirus. Ask someone who is not infected to take care of your pet until you are well. If you must care for a pet, wear a face covering. Wash your hands before and after you give care. Talk to your healthcare provider about how to keep a service animal safe, if needed.      •Follow directions from your healthcare provider for being around others after you recover. It is not known if or for how long a recovered person can pass the virus to others. Your provider may give you instructions, such as continuing social distancing and wearing a face covering. He or she will tell you when it is okay to be around others again. This may be 10 to 20 days after symptoms started or you had a positive test. Most symptoms will also need to be gone. Your provider will give you more information.      Where can I find more information?   •Centers for Disease Control and Prevention  [address] [place] [address]  Phone: 1-298.839.7000  Web Address: http://www.cdc.gov        What should I do if I think I or someone I know may be infected? Do the following to protect others:   •If emergency care is needed, tell the  about the possible infection, or call ahead and tell the emergency department.      •Call a healthcare provider for instructions if symptoms are mild. Anyone who may be infected should not arrive without calling first. The provider will need to protect staff members and other patients.      •The person who may be infected needs to wear a face covering while getting medical care. This will help lower the risk of infecting others. Coverings are not used for anyone who is younger than 2 years, has breathing problems, or cannot remove it. The provider can give you instructions for anyone who cannot wear a covering.      Call your local emergency number (911 in the [place]) or an emergency department if:   •You have trouble breathing or shortness of breath at rest.      •You have chest pain or pressure that lasts longer than 5 minutes.      •You become confused or hard to wake.      •Your lips or face are blue.      •You have a fever of 104°F (40°C) or higher.      When should I call my doctor?   •You do not have symptoms of COVID-19 but had close physical contact within 14 days with someone who tested positive.      •You have questions or concerns about your condition or care.      CARE AGREEMENT:    You have the right to help plan your care. Learn about your health condition and how it may be treated. Discuss treatment options with your healthcare providers to decide what care you want to receive. You always have the right to refuse treatment.

## 2021-08-20 NOTE — ED PROVIDER NOTE - OBJECTIVE STATEMENT
85 year old female with PMH of HTN, HLD, hx of colon CA s/p resection 1998 presenting to ED due to headache, nasal congestion and sore throat and had recently had Covid exposure in  who just tested covid positive today. Pt has had symptoms x 1 day. Had Pfizer vaccine March of this year .

## 2021-08-20 NOTE — ED PROVIDER NOTE - PATIENT PORTAL LINK FT
You can access the FollowMyHealth Patient Portal offered by [facility] by registering at the following website: [email or web address]. By joining [employer]’s FollowMyHealth portal, you will also be able to view your health information using other applications (apps) compatible with our system.

## 2021-08-20 NOTE — ED PROVIDER NOTE - CLINICAL SUMMARY MEDICAL DECISION MAKING FREE TEXT BOX
presumed Covid infection due to high risk exposure with  despite vaccination pt with symptoms that are congruent with infection. Will advise on isolation, will dc after swab and follow up with ED for results.

## 2021-08-20 NOTE — ED PROVIDER NOTE - ENMT, MLM
Airway patent, Nasal mucosa with noted drainage. Mouth with normal mucosa. Throat has no vesicles, no oropharyngeal exudates and uvula is midline.

## 2021-10-06 PROBLEM — I10 ESSENTIAL HYPERTENSION: Status: ACTIVE | Noted: 2017-05-08

## 2022-07-09 ENCOUNTER — APPOINTMENT (OUTPATIENT)
Dept: ORTHOPEDIC SURGERY | Facility: CLINIC | Age: 87
End: 2022-07-09

## 2022-07-21 ENCOUNTER — HOSPITAL ENCOUNTER (OUTPATIENT)
Age: 87
Setting detail: OBSERVATION
Discharge: HOME | End: 2022-07-22
Attending: STUDENT IN AN ORGANIZED HEALTH CARE EDUCATION/TRAINING PROGRAM | Admitting: INTERNAL MEDICINE
Payer: MEDICARE

## 2022-07-21 ENCOUNTER — APPOINTMENT (OUTPATIENT)
Dept: CT IMAGING | Age: 87
End: 2022-07-21
Attending: PHYSICIAN ASSISTANT
Payer: MEDICARE

## 2022-07-21 DIAGNOSIS — K56.609 SMALL BOWEL OBSTRUCTION (HCC): Primary | ICD-10-CM

## 2022-07-21 LAB
ALBUMIN SERPL-MCNC: 3.7 G/DL (ref 3.4–5)
ALBUMIN/GLOB SERPL: 0.9 {RATIO} (ref 0.8–1.7)
ALP SERPL-CCNC: 73 U/L (ref 45–117)
ALT SERPL-CCNC: 28 U/L (ref 13–56)
ANION GAP SERPL CALC-SCNC: 4 MMOL/L (ref 3–18)
APPEARANCE UR: CLEAR
AST SERPL-CCNC: 39 U/L (ref 10–38)
BACTERIA URNS QL MICRO: NEGATIVE /HPF
BASOPHILS # BLD: 0 K/UL (ref 0–0.1)
BASOPHILS NFR BLD: 1 % (ref 0–2)
BILIRUB SERPL-MCNC: 1 MG/DL (ref 0.2–1)
BILIRUB UR QL: NEGATIVE
BUN SERPL-MCNC: 12 MG/DL (ref 7–18)
BUN/CREAT SERPL: 13 (ref 12–20)
CALCIUM SERPL-MCNC: 9.1 MG/DL (ref 8.5–10.1)
CHLORIDE SERPL-SCNC: 106 MMOL/L (ref 100–111)
CO2 SERPL-SCNC: 31 MMOL/L (ref 21–32)
COLOR UR: YELLOW
CREAT SERPL-MCNC: 0.94 MG/DL (ref 0.6–1.3)
DIFFERENTIAL METHOD BLD: ABNORMAL
EOSINOPHIL # BLD: 0.3 K/UL (ref 0–0.4)
EOSINOPHIL NFR BLD: 9 % (ref 0–5)
EPITH CASTS URNS QL MICRO: NORMAL /LPF (ref 0–5)
ERYTHROCYTE [DISTWIDTH] IN BLOOD BY AUTOMATED COUNT: 13.3 % (ref 11.6–14.5)
GLOBULIN SER CALC-MCNC: 4 G/DL (ref 2–4)
GLUCOSE SERPL-MCNC: 99 MG/DL (ref 74–99)
GLUCOSE UR STRIP.AUTO-MCNC: NEGATIVE MG/DL
HCT VFR BLD AUTO: 42.9 % (ref 35–45)
HGB BLD-MCNC: 14.2 G/DL (ref 12–16)
HGB UR QL STRIP: NEGATIVE
IMM GRANULOCYTES # BLD AUTO: 0 K/UL (ref 0–0.04)
IMM GRANULOCYTES NFR BLD AUTO: 0 % (ref 0–0.5)
KETONES UR QL STRIP.AUTO: NEGATIVE MG/DL
LEUKOCYTE ESTERASE UR QL STRIP.AUTO: ABNORMAL
LIPASE SERPL-CCNC: 167 U/L (ref 73–393)
LYMPHOCYTES # BLD: 1.2 K/UL (ref 0.9–3.6)
LYMPHOCYTES NFR BLD: 30 % (ref 21–52)
MCH RBC QN AUTO: 29.5 PG (ref 24–34)
MCHC RBC AUTO-ENTMCNC: 33.1 G/DL (ref 31–37)
MCV RBC AUTO: 89 FL (ref 78–100)
MONOCYTES # BLD: 0.5 K/UL (ref 0.05–1.2)
MONOCYTES NFR BLD: 12 % (ref 3–10)
NEUTS SEG # BLD: 1.9 K/UL (ref 1.8–8)
NEUTS SEG NFR BLD: 49 % (ref 40–73)
NITRITE UR QL STRIP.AUTO: NEGATIVE
NRBC # BLD: 0 K/UL (ref 0–0.01)
NRBC BLD-RTO: 0 PER 100 WBC
PH UR STRIP: 7 [PH] (ref 5–8)
PLATELET # BLD AUTO: 183 K/UL (ref 135–420)
PMV BLD AUTO: 10.5 FL (ref 9.2–11.8)
POTASSIUM SERPL-SCNC: 3.5 MMOL/L (ref 3.5–5.5)
PROT SERPL-MCNC: 7.7 G/DL (ref 6.4–8.2)
PROT UR STRIP-MCNC: NEGATIVE MG/DL
RBC # BLD AUTO: 4.82 M/UL (ref 4.2–5.3)
RBC #/AREA URNS HPF: NEGATIVE /HPF (ref 0–5)
SODIUM SERPL-SCNC: 141 MMOL/L (ref 136–145)
SP GR UR REFRACTOMETRY: 1.01 (ref 1–1.03)
TROPONIN-HIGH SENSITIVITY: 8 NG/L (ref 0–54)
UROBILINOGEN UR QL STRIP.AUTO: 1 EU/DL (ref 0.2–1)
WBC # BLD AUTO: 3.9 K/UL (ref 4.6–13.2)
WBC URNS QL MICRO: NORMAL /HPF (ref 0–4)

## 2022-07-21 PROCEDURE — 85025 COMPLETE CBC W/AUTO DIFF WBC: CPT

## 2022-07-21 PROCEDURE — 80053 COMPREHEN METABOLIC PANEL: CPT

## 2022-07-21 PROCEDURE — 81001 URINALYSIS AUTO W/SCOPE: CPT

## 2022-07-21 PROCEDURE — 93005 ELECTROCARDIOGRAM TRACING: CPT

## 2022-07-21 PROCEDURE — 83735 ASSAY OF MAGNESIUM: CPT

## 2022-07-21 PROCEDURE — 83690 ASSAY OF LIPASE: CPT

## 2022-07-21 PROCEDURE — 87086 URINE CULTURE/COLONY COUNT: CPT

## 2022-07-21 PROCEDURE — 74176 CT ABD & PELVIS W/O CONTRAST: CPT

## 2022-07-21 PROCEDURE — 99285 EMERGENCY DEPT VISIT HI MDM: CPT

## 2022-07-21 PROCEDURE — 84484 ASSAY OF TROPONIN QUANT: CPT

## 2022-07-21 NOTE — ED TRIAGE NOTES
Pt reports abdominal pain since this AM. Denies N/V/D. Denies CP or SOB. Denies any urinary problem.

## 2022-07-22 ENCOUNTER — APPOINTMENT (OUTPATIENT)
Dept: GENERAL RADIOLOGY | Age: 87
End: 2022-07-22
Attending: SURGERY
Payer: MEDICARE

## 2022-07-22 VITALS
WEIGHT: 170 LBS | HEIGHT: 63 IN | RESPIRATION RATE: 18 BRPM | BODY MASS INDEX: 30.12 KG/M2 | SYSTOLIC BLOOD PRESSURE: 156 MMHG | OXYGEN SATURATION: 96 % | DIASTOLIC BLOOD PRESSURE: 80 MMHG | HEART RATE: 61 BPM | TEMPERATURE: 97.8 F

## 2022-07-22 PROBLEM — K56.609 SMALL BOWEL OBSTRUCTION (HCC): Status: ACTIVE | Noted: 2022-07-22

## 2022-07-22 LAB
ALBUMIN SERPL-MCNC: 3.5 G/DL (ref 3.4–5)
ALBUMIN/GLOB SERPL: 0.9 {RATIO} (ref 0.8–1.7)
ALP SERPL-CCNC: 66 U/L (ref 45–117)
ALT SERPL-CCNC: 28 U/L (ref 13–56)
ANION GAP SERPL CALC-SCNC: 4 MMOL/L (ref 3–18)
AST SERPL-CCNC: 40 U/L (ref 10–38)
ATRIAL RATE: 61 BPM
BASOPHILS # BLD: 0 K/UL (ref 0–0.1)
BASOPHILS NFR BLD: 1 % (ref 0–2)
BILIRUB SERPL-MCNC: 0.7 MG/DL (ref 0.2–1)
BUN SERPL-MCNC: 11 MG/DL (ref 7–18)
BUN/CREAT SERPL: 14 (ref 12–20)
CALCIUM SERPL-MCNC: 9.2 MG/DL (ref 8.5–10.1)
CALCULATED P AXIS, ECG09: 18 DEGREES
CALCULATED R AXIS, ECG10: -37 DEGREES
CALCULATED T AXIS, ECG11: 49 DEGREES
CHLORIDE SERPL-SCNC: 108 MMOL/L (ref 100–111)
CO2 SERPL-SCNC: 32 MMOL/L (ref 21–32)
CREAT SERPL-MCNC: 0.8 MG/DL (ref 0.6–1.3)
DIAGNOSIS, 93000: NORMAL
DIFFERENTIAL METHOD BLD: ABNORMAL
EOSINOPHIL # BLD: 0.3 K/UL (ref 0–0.4)
EOSINOPHIL NFR BLD: 8 % (ref 0–5)
ERYTHROCYTE [DISTWIDTH] IN BLOOD BY AUTOMATED COUNT: 13.3 % (ref 11.6–14.5)
FLUAV RNA SPEC QL NAA+PROBE: NOT DETECTED
FLUBV RNA SPEC QL NAA+PROBE: NOT DETECTED
GLOBULIN SER CALC-MCNC: 3.7 G/DL (ref 2–4)
GLUCOSE BLD STRIP.AUTO-MCNC: 88 MG/DL (ref 70–110)
GLUCOSE SERPL-MCNC: 98 MG/DL (ref 74–99)
HCT VFR BLD AUTO: 43 % (ref 35–45)
HGB BLD-MCNC: 14.1 G/DL (ref 12–16)
IMM GRANULOCYTES # BLD AUTO: 0 K/UL (ref 0–0.04)
IMM GRANULOCYTES NFR BLD AUTO: 0 % (ref 0–0.5)
INR PPP: 1.1 (ref 0.8–1.2)
LYMPHOCYTES # BLD: 0.9 K/UL (ref 0.9–3.6)
LYMPHOCYTES NFR BLD: 22 % (ref 21–52)
MAGNESIUM SERPL-MCNC: 2.4 MG/DL (ref 1.6–2.6)
MCH RBC QN AUTO: 29.2 PG (ref 24–34)
MCHC RBC AUTO-ENTMCNC: 32.8 G/DL (ref 31–37)
MCV RBC AUTO: 89 FL (ref 78–100)
MONOCYTES # BLD: 0.4 K/UL (ref 0.05–1.2)
MONOCYTES NFR BLD: 11 % (ref 3–10)
NEUTS SEG # BLD: 2.3 K/UL (ref 1.8–8)
NEUTS SEG NFR BLD: 57 % (ref 40–73)
NRBC # BLD: 0 K/UL (ref 0–0.01)
NRBC BLD-RTO: 0 PER 100 WBC
P-R INTERVAL, ECG05: 182 MS
PLATELET # BLD AUTO: 164 K/UL (ref 135–420)
PMV BLD AUTO: 9.7 FL (ref 9.2–11.8)
POTASSIUM SERPL-SCNC: 3.7 MMOL/L (ref 3.5–5.5)
PROT SERPL-MCNC: 7.2 G/DL (ref 6.4–8.2)
PROTHROMBIN TIME: 15 SEC (ref 11.5–15.2)
Q-T INTERVAL, ECG07: 390 MS
QRS DURATION, ECG06: 94 MS
QTC CALCULATION (BEZET), ECG08: 392 MS
RBC # BLD AUTO: 4.83 M/UL (ref 4.2–5.3)
SARS-COV-2, COV2: NOT DETECTED
SODIUM SERPL-SCNC: 144 MMOL/L (ref 136–145)
VENTRICULAR RATE, ECG03: 61 BPM
WBC # BLD AUTO: 3.9 K/UL (ref 4.6–13.2)

## 2022-07-22 PROCEDURE — 87636 SARSCOV2 & INF A&B AMP PRB: CPT

## 2022-07-22 PROCEDURE — 74011000636 HC RX REV CODE- 636: Performed by: INTERNAL MEDICINE

## 2022-07-22 PROCEDURE — 80053 COMPREHEN METABOLIC PANEL: CPT

## 2022-07-22 PROCEDURE — 74011000250 HC RX REV CODE- 250: Performed by: INTERNAL MEDICINE

## 2022-07-22 PROCEDURE — 99231 SBSQ HOSP IP/OBS SF/LOW 25: CPT | Performed by: SURGERY

## 2022-07-22 PROCEDURE — 99236 HOSP IP/OBS SAME DATE HI 85: CPT | Performed by: INTERNAL MEDICINE

## 2022-07-22 PROCEDURE — 85610 PROTHROMBIN TIME: CPT

## 2022-07-22 PROCEDURE — 65390000012 HC CONDITION CODE 44 OBSERVATION

## 2022-07-22 PROCEDURE — 74011250637 HC RX REV CODE- 250/637: Performed by: INTERNAL MEDICINE

## 2022-07-22 PROCEDURE — 74250 X-RAY XM SM INT 1CNTRST STD: CPT

## 2022-07-22 PROCEDURE — 97165 OT EVAL LOW COMPLEX 30 MIN: CPT

## 2022-07-22 PROCEDURE — G0378 HOSPITAL OBSERVATION PER HR: HCPCS

## 2022-07-22 PROCEDURE — 36415 COLL VENOUS BLD VENIPUNCTURE: CPT

## 2022-07-22 PROCEDURE — 82962 GLUCOSE BLOOD TEST: CPT

## 2022-07-22 PROCEDURE — 85025 COMPLETE CBC W/AUTO DIFF WBC: CPT

## 2022-07-22 PROCEDURE — 99222 1ST HOSP IP/OBS MODERATE 55: CPT | Performed by: INTERNAL MEDICINE

## 2022-07-22 PROCEDURE — 74011250636 HC RX REV CODE- 250/636: Performed by: INTERNAL MEDICINE

## 2022-07-22 RX ORDER — IPRATROPIUM BROMIDE AND ALBUTEROL SULFATE 2.5; .5 MG/3ML; MG/3ML
3 SOLUTION RESPIRATORY (INHALATION)
Status: DISCONTINUED | OUTPATIENT
Start: 2022-07-22 | End: 2022-07-22 | Stop reason: HOSPADM

## 2022-07-22 RX ORDER — CHOLECALCIFEROL (VITAMIN D3) 125 MCG
5 CAPSULE ORAL
Status: DISCONTINUED | OUTPATIENT
Start: 2022-07-22 | End: 2022-07-22 | Stop reason: HOSPADM

## 2022-07-22 RX ORDER — ERGOCALCIFEROL 1.25 MG/1
CAPSULE ORAL
COMMUNITY
Start: 2022-05-09

## 2022-07-22 RX ORDER — SODIUM CHLORIDE 9 MG/ML
100 INJECTION, SOLUTION INTRAVENOUS CONTINUOUS
Status: DISCONTINUED | OUTPATIENT
Start: 2022-07-22 | End: 2022-07-22 | Stop reason: HOSPADM

## 2022-07-22 RX ORDER — SIMVASTATIN 5 MG/1
5 TABLET, FILM COATED ORAL EVERY EVENING
COMMUNITY
Start: 2022-05-07

## 2022-07-22 RX ORDER — POLYETHYLENE GLYCOL 3350 17 G/17G
17 POWDER, FOR SOLUTION ORAL DAILY PRN
Status: DISCONTINUED | OUTPATIENT
Start: 2022-07-22 | End: 2022-07-22 | Stop reason: HOSPADM

## 2022-07-22 RX ORDER — ONDANSETRON 4 MG/1
4 TABLET, ORALLY DISINTEGRATING ORAL
Status: DISCONTINUED | OUTPATIENT
Start: 2022-07-22 | End: 2022-07-22 | Stop reason: HOSPADM

## 2022-07-22 RX ORDER — ALENDRONATE SODIUM 70 MG/1
TABLET ORAL
COMMUNITY
Start: 2022-06-14

## 2022-07-22 RX ORDER — ONDANSETRON 2 MG/ML
4 INJECTION INTRAMUSCULAR; INTRAVENOUS
Status: DISCONTINUED | OUTPATIENT
Start: 2022-07-22 | End: 2022-07-22 | Stop reason: HOSPADM

## 2022-07-22 RX ORDER — HYDROCORTISONE 25 MG/G
OINTMENT TOPICAL
COMMUNITY
Start: 2022-07-05

## 2022-07-22 RX ORDER — POTASSIUM CHLORIDE 20 MEQ/1
40 TABLET, EXTENDED RELEASE ORAL DAILY
COMMUNITY
Start: 2022-05-07

## 2022-07-22 RX ORDER — FAMOTIDINE 20 MG/1
20 TABLET, FILM COATED ORAL 2 TIMES DAILY
Status: DISCONTINUED | OUTPATIENT
Start: 2022-07-22 | End: 2022-07-22 | Stop reason: HOSPADM

## 2022-07-22 RX ORDER — ACETAMINOPHEN AND CODEINE PHOSPHATE 300; 30 MG/1; MG/1
1 TABLET ORAL 2 TIMES DAILY
COMMUNITY
Start: 2022-04-26

## 2022-07-22 RX ORDER — AMLODIPINE BESYLATE 10 MG/1
10 TABLET ORAL DAILY
Status: DISCONTINUED | OUTPATIENT
Start: 2022-07-22 | End: 2022-07-22 | Stop reason: HOSPADM

## 2022-07-22 RX ORDER — ACETAMINOPHEN 325 MG/1
650 TABLET ORAL
Status: DISCONTINUED | OUTPATIENT
Start: 2022-07-22 | End: 2022-07-22 | Stop reason: HOSPADM

## 2022-07-22 RX ORDER — ACETAMINOPHEN 650 MG/1
650 SUPPOSITORY RECTAL
Status: DISCONTINUED | OUTPATIENT
Start: 2022-07-22 | End: 2022-07-22 | Stop reason: HOSPADM

## 2022-07-22 RX ORDER — FAMOTIDINE 20 MG/1
20 TABLET, FILM COATED ORAL 2 TIMES DAILY
COMMUNITY
Start: 2022-05-07

## 2022-07-22 RX ORDER — AMLODIPINE BESYLATE 10 MG/1
10 TABLET ORAL DAILY
COMMUNITY
Start: 2022-05-07

## 2022-07-22 RX ORDER — SODIUM CHLORIDE 0.9 % (FLUSH) 0.9 %
5-40 SYRINGE (ML) INJECTION AS NEEDED
Status: DISCONTINUED | OUTPATIENT
Start: 2022-07-22 | End: 2022-07-22 | Stop reason: HOSPADM

## 2022-07-22 RX ORDER — HYDRALAZINE HYDROCHLORIDE 20 MG/ML
10 INJECTION INTRAMUSCULAR; INTRAVENOUS
Status: DISCONTINUED | OUTPATIENT
Start: 2022-07-22 | End: 2022-07-22 | Stop reason: HOSPADM

## 2022-07-22 RX ORDER — NALOXONE HYDROCHLORIDE 0.4 MG/ML
0.4 INJECTION, SOLUTION INTRAMUSCULAR; INTRAVENOUS; SUBCUTANEOUS
Status: DISCONTINUED | OUTPATIENT
Start: 2022-07-22 | End: 2022-07-22 | Stop reason: HOSPADM

## 2022-07-22 RX ORDER — SODIUM CHLORIDE 0.9 % (FLUSH) 0.9 %
5-40 SYRINGE (ML) INJECTION EVERY 8 HOURS
Status: DISCONTINUED | OUTPATIENT
Start: 2022-07-22 | End: 2022-07-22 | Stop reason: HOSPADM

## 2022-07-22 RX ORDER — MORPHINE SULFATE 2 MG/ML
2-4 INJECTION, SOLUTION INTRAMUSCULAR; INTRAVENOUS
Status: DISCONTINUED | OUTPATIENT
Start: 2022-07-22 | End: 2022-07-22 | Stop reason: HOSPADM

## 2022-07-22 RX ADMIN — FAMOTIDINE 20 MG: 20 TABLET ORAL at 17:19

## 2022-07-22 RX ADMIN — SODIUM CHLORIDE, PRESERVATIVE FREE 10 ML: 5 INJECTION INTRAVENOUS at 07:04

## 2022-07-22 RX ADMIN — DIATRIZOATE MEGLUMINE AND DIATRIZOATE SODIUM 240 ML: 660; 100 LIQUID ORAL; RECTAL at 13:35

## 2022-07-22 RX ADMIN — SODIUM CHLORIDE, PRESERVATIVE FREE 10 ML: 5 INJECTION INTRAVENOUS at 00:08

## 2022-07-22 RX ADMIN — SODIUM CHLORIDE 100 ML/HR: 9 INJECTION, SOLUTION INTRAVENOUS at 06:59

## 2022-07-22 RX ADMIN — AMLODIPINE BESYLATE 10 MG: 10 TABLET ORAL at 15:00

## 2022-07-22 NOTE — PROGRESS NOTES
Reason for Admission:  Small bowel obstruction ([facility] [address] [K56.609]                 RUR Score:    6%           Plan for utilizing home health:                        Likelihood of Readmission:   LOW                         Transition of Care Plan:              Initial assessment completed with patient. Cognitive status of patient: oriented to time, place, person and situation. Face sheet information confirmed:  yes. This patient lives in a multifamily building with daughter and grand-children. Patient is able to navigate steps as needed. Prior to hospitalization, patient was considered to be independent with ADLs/IADLS : yes . Patient has a current ACP document on file: no      Healthcare Decision Maker:     Click here to complete [address] including selection of the Healthcare Decision Maker Relationship (ie \"Primary\")    The patient's daughter will be available to transport patient home upon discharge. The patient already has [address] [facility] [address] and [employer] available in the home. Patient is not currently active with home health. Patient has not stayed in a skilled nursing facility or rehab. Was  stay within last 60 days : yes. This patient is on dialysis :no    Freedom of choice signed: no. Currently, the discharge plan is Home. The patient states that she can obtain her medications from the pharmacy, and take her medications as directed. Patient's current insurance is Medicare [employer] / [employer] out of state      Care Management Interventions  PCP Verified by CM: Yes (Dr. Jody Valdovinos. Emmett Box MD)  Palliative Care Criteria Met (RRAT>21 & CHF Dx)?: No  Mode of Transport at Discharge:  Other (see comment) (Daughter)  MyChart Signup: No  Discharge Durable Medical Equipment: No  Physical Therapy Consult: No  Occupational Therapy Consult: Yes  Speech Therapy Consult: No  Support Systems: Child(joey)  The Patient and/or Patient Representative was Provided with a Choice of Provider and Agrees with the Discharge Plan?: No  Freedom of Choice List was Provided with Basic Dialogue that Supports the Patient's Individualized Plan of Care/Goals, Treatment Preferences and Shares the Quality Data Associated with the Providers?: No  Discharge Location  Patient Expects to be Discharged to[de-identified] Home      LAUREEN Arredondo, [facility]

## 2022-07-22 NOTE — ED NOTES
TRANSFER - OUT REPORT:    Verbal report given to Indigo RN on Rose Mary Radha  being transferred to  for routine progression of care       Report consisted of patients Situation, Background, Assessment and   Recommendations(SBAR). Information from the following report(s) ED Summary was reviewed with the receiving nurse. Lines:   Peripheral IV 07/22/22 Right Forearm (Active)   Site Assessment Clean, dry, & intact 07/22/22 0450   Phlebitis Assessment 0 07/22/22 0450   Infiltration Assessment 0 07/22/22 0450   Dressing Status Clean, dry, & intact 07/22/22 0450        Opportunity for questions and clarification was provided.

## 2022-07-22 NOTE — DISCHARGE INSTRUCTIONS
Discharge Instructions    Patient: Roberto Gann MRN: [id number]  CSN: [id number]    YOB: 1935  Age: 82 y.o. Sex: female    DOA: 7/21/2022 LOS: 0   Discharge Date: 7/22/2022     ACUTE DIAGNOSES:  Small bowel obstruction ([facility] [address] [Z85.517]    CT Results (most recent):  Results from Hospital Encounter encounter on 07/21/22    CT ABD PELV WO CONT    Narrative  CT Of The Abdomen And Pelvis Without Contrast    CPT CODE 72254,04774    CLINICAL HISTORY: Epigastric pain in a patient with history of hiatal hernia. .  COMPARISON: None. FINDINGS:  CT Of The Abdomen and pelvis:    Lung bases, heart and pericardium: Subsegmental opacity right lung base. No  effusions. Moderate burden coronary artery disease. .    Liver: Normal.    Gallbladder/biliary: Normal.    Spleen: Normal.    Pancreas: Normal.    Adrenals: Normal.    Kidneys/ureters: Normal.    Bowel: No hiatal hernia on the current study. Stomach and duodenum unremarkable. There are dilated proximal small bowel loops measuring up to 29 mm. There is  distortion of the mesentery with stellate appearance in the right mid abdomen  and transition to collapsed small bowel (axial 41, coronal 25, sagittal 49). Distal small bowel loops than are collapsed. No bowel wall thickening. Patient had a right colectomy. There remains stool and air in the colon. Multiple left-sided diverticula are noted without associated bowel wall  thickening. Lymph nodes: Normal.    Peritoneal space: Normal.    Retroperitoneum: Normal.    : Enlarged uterus with multiple coarse calcifications compatible with  fibroids. Bladder not well distended but appears unremarkable. MSK/body wall: Multilevel advanced degenerative changes thoracolumbar spine. Grade 1 anterolisthesis L4 on L5. Impression  Small bowel obstruction right midabdomen probably related to adhesions. Enlarged fibroid uterus. Diverticulosis coli. Status post right colectomy.         EXAM: Small Bowel Follow Through     INDICATION: Partial small bowel obstruction. Abdominal pain. Status post right  colectomy. TECHNIQUE: Gastrografin small bowel follow-through     Fluoroscopy time: None     Images: 16     COMPARISON: CT 7/21/2022     FINDINGS:  : Calcification at the pelvis area within normal fibroid uterus. Dilated  small bowel loops are noted. Overheads: Gastrografin contrast was administered by mouth. Subsequent images  were obtained until contrast reached the colon. No constricting or obstructing  lesions are identified. Limited evaluation of mucosal detail secondary to use  of water-soluble contrast.  Initial post film obtained demonstrates contrast in  the stomach and small bowel. Small bowel follow-through examination  demonstrates contrast flowing from the stomach to colon between 4.5 hours. IMPRESSION     1. Mildly dilated small bowel  loops without evidence of obstruction. DISCHARGE MEDICATIONS:     It is important that you take the medication exactly as they are prescribed. Keep your medication in the bottles provided by the pharmacist and keep a list of the medication names, dosages, and times to be taken in your wallet. Do not take other medications without consulting your doctor. DIET:  Cardiac Diet and Low fat, Low cholesterol    ACTIVITY: Activity as tolerated    ADDITIONAL INFORMATION: If you experience any of the following symptoms then please call your primary care physician or return to the emergency room if you cannot get hold of your doctor: Fever, chills, nausea, vomiting, diarrhea, change in mentation, falling, bleeding, shortness of breath. FOLLOW UP CARE:  Primary Care Physician, you are to call and set up an appointment to see them in 2 weeks. Follow-up with your Gastrointestinal specialist in [place] on Monday      Information obtained by :  I understand that if any problems occur once I am at home I am to contact my physician.     I understand and acknowledge receipt of the instructions indicated above. Physician's or R.N.'s Signature                                                                  Date/Time                                                                                                                                              Patient or Representative Signature                                                          Date/Time    Teresa Murrell MD  7/22/2022  2:03 PM  Patient armband removed and shredded  DISCHARGE SUMMARY from Nurse    PATIENT INSTRUCTIONS:    After general anesthesia or intravenous sedation, for 24 hours or while taking prescription Narcotics:  Limit your activities  Do not drive and operate hazardous machinery  Do not make important personal or business decisions  Do  not drink alcoholic beverages  If you have not urinated within 8 hours after discharge, please contact your surgeon on call. Report the following to your surgeon:  Excessive pain, swelling, redness or odor of or around the surgical area  Temperature over 100.5  Nausea and vomiting lasting longer than 4 hours or if unable to take medications  Any signs of decreased circulation or nerve impairment to extremity: change in color, persistent  numbness, tingling, coldness or increase pain  Any questions    What to do at Home:  Recommended activity: Activity as tolerated,     If you experience any of the following symptoms temp 101 or above, abdominal pain, severe nausea or vomiting, unable to pass stool, blood in stool, please follow up with Emergency Department or Primary MD.    *  Please give a list of your current medications to your Primary Care Provider.     *  Please update this list whenever your medications are discontinued, doses are      changed, or new medications (including over-the-counter products) are added. *  Please carry medication information at all times in case of emergency situations. These are general instructions for a healthy lifestyle:    No smoking/ No tobacco products/ Avoid exposure to second hand smoke  Surgeon General's Warning:  Quitting smoking now greatly reduces serious risk to your health. Obesity, smoking, and sedentary lifestyle greatly increases your risk for illness    A healthy diet, regular physical exercise & weight monitoring are important for maintaining a healthy lifestyle    You may be retaining fluid if you have a history of heart failure or if you experience any of the following symptoms:  Weight gain of 3 pounds or more overnight or 5 pounds in a week, increased swelling in our hands or feet or shortness of breath while lying flat in bed. Please call your doctor as soon as you notice any of these symptoms; do not wait until your next office visit. The discharge information has been reviewed with the patient. The patient verbalized understanding. Discharge medications reviewed with the patient and appropriate educational materials and side effects teaching were provided.   ___________________________________________________________________________________________________________________________________

## 2022-07-22 NOTE — PROGRESS NOTES
Comprehensive Nutrition Assessment    Type and Reason for Visit: Initial, Positive nutrition screen    Nutrition Recommendations/Plan:   Monitor GI symptoms and diet tolerance. Add oral nutrition supplement to optimize nutrition intake opportunity: Glucerna Shake BID  Continue IV fluids per MD      Malnutrition Assessment:  Malnutrition Status: At risk for malnutrition (specify) (NPO with SBO and poor intake x 3-4 days PTA) (07/22/22 1551)      Nutrition History and Allergies:   Pertinent PMH: HTN, colon ca with colectomy. Presented with recent burning to her epigastric area and abdominal pain x last 3 days. Endorses decreased appetite with n/v x 3-4 days PTA, consumed bites of food yesterday (last po intake). Reports stable wt hx with UBW of 170 lb. NKFA. Nutrition Assessment:    Admitted with SBO. Gastrografin completed today. No NGT in place. Pt denies nausea at this time. Diet just started. Possible discharge today. Nutrition Related Findings:    Last BM 7/21, loose. Pertinent Meds: pepcid, NS at 100 mL/hr Wound Type: None    Current Nutrition Intake & Therapies:  Average Meal Intake: Unable to assess (diet just started)     ADULT DIET Full Liquid; 3 carb choices (45 gm/meal); Low Fat/Low Chol/High Fiber/CHELY    Anthropometric Measures:  Height: 5' 3\" (160 cm)  Ideal Body Weight (IBW): 115 lbs (52 kg)  Admission Body Weight: 169 lb 15.6 oz  Current Body Wt:  77.1 kg (169 lb 15.6 oz), 147.8 % IBW. Not specified  Current BMI (kg/m2): 30.1  Usual Body Weight: 77.1 kg (170 lb)  % Weight Change (Calculated): 0  Weight Adjustment: No adjustment                 BMI Category: Obese class 1 (BMI 30.0-34. 9)    Estimated Daily Nutrient Needs:  Energy Requirements Based On: Formula (MSJx1.2-1.3)  Weight Used for Energy Requirements: Current  Energy (kcal/day): 1294-4642  Weight Used for Protein Requirements: Current  Protein (g/day): 62-77 (0.8-1)  Method Used for Fluid Requirements: 1 ml/kcal  Fluid (ml/day): 8201-1184    Nutrition Diagnosis:   Inadequate oral intake related to altered GI function as evidenced by NPO or clear liquid status due to medical condition    Nutrition Interventions:   Food and/or Nutrient Delivery: IV fluid delivery, Continue current diet, Start oral nutrition supplement     Coordination of Nutrition Care: Continue to monitor while inpatient  Plan of Care discussed with: patient    Goals:     Goals: by next RD assessment, Meet at least 75% of estimated needs       Nutrition Monitoring and Evaluation:      Food/Nutrient Intake Outcomes: Diet advancement/tolerance, IVF intake, Food and nutrient intake, Supplement intake  Physical Signs/Symptoms Outcomes: Biochemical data, GI status, Nausea/vomiting, Meal time behavior, Nutrition focused physical findings    Discharge Planning:    Continue current diet    Rogelio Adkins, [address]  Contact: [phone]

## 2022-07-22 NOTE — DISCHARGE SUMMARY
Discharge Summary    Patient: Cole Patel               Sex: female          DOA: 2022         YOB: 1935      Age:  82 y.o.        LOS:  LOS: 1 day                Admit Date: 2022    Discharge Date: 2022    Primary care physician: None    Discharge Diagnoses:    Abdominal pain with Small bowel obstruction, resolved   Hypertension   GERD     Discharge Condition: Good  Disposition: home   Code Status: full code     Follow up for Primary Care Physician:  1) she needs follow up with GI and general surgery for further care. Hospital Course:   82 y.o female with HTN, GERD, h/o colon cancer with colectomy, presented to the ER with abdominal pain and liquid stool. She had no fever/chill. She has no recent abnormal food intake. She has h/o colon resection and hernia repair in the remote past. In the ER, she was found to have SBO on CT abd/plev. She did no have nausea/vomiting, hence no NG tube was needed. Since this AM, she has 1 BM. Surgery evaluated and recommended Gastrografin which showed no obstruction. She was able to have several BM post her gastrografin. Her abdominal pain subsided. Surgery ok for her to be discharged as she tolerates oral intake.   Her IV fluid was stopped     ROS: No fever/chills, no headache, no dizziness, no facial pain, no sinus congestion,   No swallowing pain, No chest pain, no palpitation, no shortness of breath, + abd pain,  No diarrhea, no urinary complaint, no leg pain or swelling     VS: Visit Vitals  BP (!) 156/80   Pulse 61   Temp 97.8 °F (36.6 °C)   Resp 18   Ht 5' 3\" (1.6 m)   Wt 77.1 kg (170 lb)   SpO2 96%   Breastfeeding No   BMI 30.11 kg/m²      Tmax/24hrs: Temp (24hrs), Av.8 °F (36.6 °C), Min:97 °F (36.1 °C), Max:98.4 °F (36.9 °C)  No intake or output data in the 24 hours ending 22 3134    Tele:   General:  Cooperative, Not in acute distress, speaks in full sentence while in bed  HEENT: PERRL, EOMI, supple neck, no JVD, dry oral mucosa  Cardiovascular: S1S2 regular, no rub/gallop   Pulmonary: Clear air entry bilaterally, no wheezing, no crackle  GI:  Soft, + tender, non distended, +bs, no guarding   Extremities:  No pedal edema, +distal pulses appreciated   Neuro: AOx3, moving all extremities, no gross deficit. Consults:  General surgery: Dr. Charleen Isaac:   CT Results (most recent):  Results from Hospital Encounter encounter on 07/21/22    CT ABD PELV WO CONT    Narrative  CT Of The Abdomen And Pelvis Without Contrast    CPT CODE 04378,[id number]    CLINICAL HISTORY: Epigastric pain in a patient with history of hiatal hernia. .    TECHNIQUE: 5 mm helical MDCT scan was obtained of the abdomen and pelvis. Sagittal and coronal images created from original data set. All CT scans at  this facility are performed using dose optimization techniques as appropriate to  a performed exam, to include automated exposure control, adjustment of the mA  and/or kV according to patient's size (including appropriate matching for site  specific examinations), or use of iterative reconstruction technique. COMPARISON: None. FINDINGS:    CT Of The Abdomen and pelvis:    Lung bases, heart and pericardium: Subsegmental opacity right lung base. No  effusions. Moderate burden coronary artery disease. .    Liver: Normal.    Gallbladder/biliary: Normal.    Spleen: Normal.    Pancreas: Normal.    Adrenals: Normal.    Kidneys/ureters: Normal.    Bowel: No hiatal hernia on the current study. Stomach and duodenum unremarkable. There are dilated proximal small bowel loops measuring up to 29 mm. There is  distortion of the mesentery with stellate appearance in the right mid abdomen  and transition to collapsed small bowel (axial 41, coronal 25, sagittal 49). Distal small bowel loops than are collapsed. No bowel wall thickening. Patient had a right colectomy. There remains stool and air in the colon.   Multiple left-sided diverticula are noted without associated bowel wall  thickening. Lymph nodes: Normal.    Peritoneal space: Normal.    Retroperitoneum: Normal.    : Enlarged uterus with multiple coarse calcifications compatible with  fibroids. Bladder not well distended but appears unremarkable. MSK/body wall: Multilevel advanced degenerative changes thoracolumbar spine. Grade 1 anterolisthesis L4 on L5. Impression  Small bowel obstruction right midabdomen probably related to adhesions. Enlarged fibroid uterus. Diverticulosis coli. Status post right colectomy. XR Results (most recent):  Results from Hospital Encounter encounter on 07/21/22    XR GASTROGRAFFIN SMALL BOWEL    Narrative  EXAM: Small Bowel Follow Through    INDICATION: Partial small bowel obstruction. Abdominal pain. Status post right  colectomy. TECHNIQUE: Gastrografin small bowel follow-through    Fluoroscopy time: None    Images: 16    COMPARISON: CT 7/21/2022    FINDINGS:  : Calcification at the pelvis area within normal fibroid uterus. Dilated  small bowel loops are noted. Overheads: Gastrografin contrast was administered by mouth. Subsequent images  were obtained until contrast reached the colon. No constricting or obstructing  lesions are identified. Limited evaluation of mucosal detail secondary to use  of water-soluble contrast.  Initial post film obtained demonstrates contrast in  the stomach and small bowel. Small bowel follow-through examination  demonstrates contrast flowing from the stomach to colon between 4.5 hours. Impression  1. Mildly dilated small bowel  loops without evidence of obstruction.         Lab/Data Review:  Labs: Results:       Chemistry Recent Labs     07/22/22  1152 07/21/22 2003   GLU 98 99    141   K 3.7 3.5    106   CO2 32 31   BUN 11 12   CREA 0.80 0.94   CA 9.2 9.1   AGAP 4 4   BUCR 14 13   AP 66 73   TP 7.2 7.7   ALB 3.5 3.7   GLOB 3.7 4.0   AGRAT 0.9 0.9      CBC w/Diff Recent Labs 07/22/22  1152 07/21/22 2003   WBC 3.9* 3.9*   RBC 4.83 4.82   HGB 14.1 14.2   HCT 43.0 42.9    183   GRANS 57 49   LYMPH 22 30   EOS 8* 9*      Coagulation Recent Labs     07/22/22  0450   PTP 15.0   INR 1.1       Iron/Ferritin No results for input(s): IRON in the last 72 hours. No lab exists for component: TIBCCALC   BNP No results for input(s): BNPP in the last 72 hours. Cardiac Enzymes No results for input(s): CPK, CKND1, KOKI in the last 72 hours. No lab exists for component: CKRMB, TROIP   Liver Enzymes Recent Labs     07/22/22  1152   TP 7.2   ALB 3.5   AP 66      Thyroid Studies No results for input(s): T4, T3U, TSH, TSHEXT in the last 72 hours. No lab exists for component: T3RU       All Micro Results       Procedure Component Value Units Date/Time    COVID-19 WITH INFLUENZA A/B [[id number]] Collected: 07/22/22 0750    Order Status: Completed Specimen: Nasopharyngeal Updated: 07/22/22 0833     SARS-CoV-2 by PCR Not detected        Comment: Not Detected results do not preclude SARS-CoV-2 infection and should not be used as the sole basis for patient management decisions. Results must be combined with clinical observations, patient history, and epidemiological information. Influenza A by PCR Not detected        Influenza B by PCR Not detected        Comment: Testing was performed using darling Yelena SARS-CoV-2 and Influenza A/B nucleic acid assay. This test is a multiplex Real-Time Reverse Transcriptase Polymerase Chain Reaction (RT-PCR) based in [employer] diagnostic test intended for the qualitative detection of nucleic acids from SARS-CoV-2, Influenza A, and Influenza B in nasopharyngeal for use under the FDA's Emergency Use Authorization (EAU) only.        Fact sheet for Patients: FindDrives[employer]pl  Fact sheet for Healthcare Providers: [employer]         CULTURE, URINE [[id number]] Collected: 07/21/22 2005    Order Status: Sent Specimen: Urine from Clean catch Updated: 07/21/22 7034                  Medications at discharge  including reasons for change and indications for new ones:   Current Discharge Medication List        CONTINUE these medications which have NOT CHANGED    Details   acetaminophen-codeine (TYLENOL #3) 300-30 mg per tablet Take 1 Tablet by mouth two (2) times a day. alendronate (FOSAMAX) 70 mg tablet TAKE 1 TABLET BY MOUTH EVERY WEEK      amLODIPine (NORVASC) 10 mg tablet Take 10 mg by mouth in the morning. famotidine (PEPCID) 20 mg tablet Take 20 mg by mouth two (2) times a day. ergocalciferol (ERGOCALCIFEROL) 1,250 mcg (50,000 unit) capsule TAKE 1 CAPSULE BY MOUTH EVERY WEEK      simvastatin (ZOCOR) 5 mg tablet Take 5 mg by mouth every evening. hydrocortisone (HYTONE) 2.5 % ointment APPLY TOPICALLY TO THE AFFECTED AREA TWICE DAILY      potassium chloride (K-DUR, KLOR-CON M20) 20 mEq tablet Take 40 mEq by mouth in the morning.                      Pending laboratory work and tests: none    Activity: Activity as tolerated    Diet: Cardiac Diet and Low fat, Low cholesterol    Wound Care: None needed      Time spent >30 minutes  Martin Knutson MD  7/22/2022  4:54 PM

## 2022-07-22 NOTE — ED PROVIDER NOTES
EMERGENCY DEPARTMENT HISTORY AND PHYSICAL EXAM      Date: 7/21/2022  Patient Name: Josefina Fowler    History of Presenting Illness     Chief Complaint   Patient presents with    Abdominal Pain       History Provided By: Patient    HPI: Josefina Fowler, 82 y.o. female PMHx significant for htn presents ambulatory to the ED. Pt reports intermittent burning to epigastric area that began a few hours prior to arrival.  Patient reports symptoms began shortly after eating. Patient reports decreased appetite. Patient reports nausea without vomiting. Bowel movements have been normal. Last BM today and passing gas. Denies CP, SOB, dizziness. Denies cardiac history. History of hiatal hernia. Previous abd sugeries include colectomy and 2 hernia repairs. Patient currently resides in [place] and is in [place] visiting family. Patient reports last colonoscopy is about 5 months ago. There are no other complaints, changes, or physical findings at this time. PCP: None    No current facility-administered medications on file prior to encounter. No current outpatient medications on file prior to encounter. Past History     Past Medical History:  No past medical history on file. Past Surgical History:  No past surgical history on file. Family History:  No family history on file. Social History: Allergies:  No Known Allergies      Review of Systems   Review of Systems   Constitutional:  Negative for chills and fever. Respiratory:  Negative for shortness of breath. Cardiovascular:  Negative for chest pain. Gastrointestinal:  Positive for abdominal pain. Negative for nausea and vomiting. Genitourinary:  Negative for flank pain. Musculoskeletal:  Negative for back pain and myalgias. Skin:  Negative for color change, pallor, rash and wound. Neurological:  Negative for dizziness, weakness and light-headedness. All other systems reviewed and are negative.     Physical Exam   Physical Exam  Vitals and nursing note reviewed. Constitutional:       General: She is not in acute distress. Appearance: She is well-developed. Comments: Pt in NAD   HENT:      Head: Normocephalic and atraumatic. Eyes:      Conjunctiva/sclera: Conjunctivae normal.   Cardiovascular:      Rate and Rhythm: Normal rate and regular rhythm. Heart sounds: Normal heart sounds. Pulmonary:      Effort: Pulmonary effort is normal. No respiratory distress. Breath sounds: Normal breath sounds. Abdominal:      General: Bowel sounds are normal. There is no distension. Palpations: Abdomen is soft. Tenderness: There is abdominal tenderness in the epigastric area. Comments: Abdomen soft  Nondistended  No guarding or rigidity     Musculoskeletal:         General: Normal range of motion. Skin:     General: Skin is warm. Findings: No rash. Neurological:      Mental Status: She is alert and oriented to person, place, and time. Psychiatric:         Behavior: Behavior normal.       Diagnostic Study Results     Labs -     Recent Results (from the past 12 hour(s))   CBC WITH AUTOMATED DIFF    Collection Time: 07/21/22  8:03 PM   Result Value Ref Range    WBC 3.9 (L) 4.6 - 13.2 K/uL    RBC 4.82 4.20 - 5.30 M/uL    HGB 14.2 12.0 - 16.0 g/dL    HCT 42.9 35.0 - 45.0 %    MCV 89.0 78.0 - 100.0 FL    MCH 29.5 24.0 - 34.0 PG    MCHC 33.1 31.0 - 37.0 g/dL    RDW 13.3 11.6 - 14.5 %    PLATELET 299 046 - 789 K/uL    MPV 10.5 9.2 - 11.8 FL    NRBC 0.0 0  WBC    ABSOLUTE NRBC 0.00 0.00 - 0.01 K/uL    NEUTROPHILS 49 40 - 73 %    LYMPHOCYTES 30 21 - 52 %    MONOCYTES 12 (H) 3 - 10 %    EOSINOPHILS 9 (H) 0 - 5 %    BASOPHILS 1 0 - 2 %    IMMATURE GRANULOCYTES 0 0.0 - 0.5 %    ABS. NEUTROPHILS 1.9 1.8 - 8.0 K/UL    ABS. LYMPHOCYTES 1.2 0.9 - 3.6 K/UL    ABS. MONOCYTES 0.5 0.05 - 1.2 K/UL    ABS. EOSINOPHILS 0.3 0.0 - 0.4 K/UL    ABS. BASOPHILS 0.0 0.0 - 0.1 K/UL    ABS. IMM.  GRANS. 0.0 0.00 - 0.04 K/UL DF AUTOMATED     METABOLIC PANEL, COMPREHENSIVE    Collection Time: 07/21/22  8:03 PM   Result Value Ref Range    Sodium 141 136 - 145 mmol/L    Potassium 3.5 3.5 - 5.5 mmol/L    Chloride 106 100 - 111 mmol/L    CO2 31 21 - 32 mmol/L    Anion gap 4 3.0 - 18 mmol/L    Glucose 99 74 - 99 mg/dL    BUN 12 7.0 - 18 MG/DL    Creatinine 0.94 0.6 - 1.3 MG/DL    BUN/Creatinine ratio 13 12 - 20      GFR est AA >60 >60 ml/min/1.73m2    GFR est non-AA 57 (L) >60 ml/min/1.73m2    Calcium 9.1 8.5 - 10.1 MG/DL    Bilirubin, total 1.0 0.2 - 1.0 MG/DL    ALT (SGPT) 28 13 - 56 U/L    AST (SGOT) 39 (H) 10 - 38 U/L    Alk.  phosphatase 73 45 - 117 U/L    Protein, total 7.7 6.4 - 8.2 g/dL    Albumin 3.7 3.4 - 5.0 g/dL    Globulin 4.0 2.0 - 4.0 g/dL    A-G Ratio 0.9 0.8 - 1.7     LIPASE    Collection Time: 07/21/22  8:03 PM   Result Value Ref Range    Lipase 167 73 - 393 U/L   TROPONIN-HIGH SENSITIVITY    Collection Time: 07/21/22  8:03 PM   Result Value Ref Range    Troponin-High Sensitivity 8 0 - 54 ng/L   URINALYSIS W/ RFLX MICROSCOPIC    Collection Time: 07/21/22  8:05 PM   Result Value Ref Range    Color YELLOW      Appearance CLEAR      Specific gravity 1.010 1.005 - 1.030      pH (UA) 7.0 5.0 - 8.0      Protein Negative NEG mg/dL    Glucose Negative NEG mg/dL    Ketone Negative NEG mg/dL    Bilirubin Negative NEG      Blood Negative NEG      Urobilinogen 1.0 0.2 - 1.0 EU/dL    Nitrites Negative NEG      Leukocyte Esterase MODERATE (A) NEG     URINE MICROSCOPIC ONLY    Collection Time: 07/21/22  8:05 PM   Result Value Ref Range    WBC 4 to 10 0 - 4 /hpf    RBC Negative 0 - 5 /hpf    Epithelial cells 1+ 0 - 5 /lpf    Bacteria Negative NEG /hpf   EKG, 12 LEAD, INITIAL    Collection Time: 07/21/22  9:10 PM   Result Value Ref Range    Ventricular Rate 61 BPM    Atrial Rate 61 BPM    P-R Interval 182 ms    QRS Duration 94 ms    Q-T Interval 390 ms    QTC Calculation (Bezet) 392 ms    Calculated P Axis 18 degrees    Calculated R Axis -37 degrees    Calculated T Axis 49 degrees    Diagnosis       Normal sinus rhythm with sinus arrhythmia  Left axis deviation  Nonspecific T wave abnormality  Abnormal ECG  No previous ECGs available         Radiologic Studies -   CT ABD PELV WO CONT   Final Result      Small bowel obstruction right midabdomen probably related to adhesions. Enlarged fibroid uterus. Diverticulosis coli. Status post right colectomy. CT Results  (Last 48 hours)                 07/21/22 2159  CT ABD PELV WO CONT Final result    Impression:      Small bowel obstruction right midabdomen probably related to adhesions. Enlarged fibroid uterus. Diverticulosis coli. Status post right colectomy. Narrative:  CT Of The Abdomen And Pelvis Without Contrast       CPT CODE 07540,10317       CLINICAL HISTORY: Epigastric pain in a patient with history of hiatal hernia. .       TECHNIQUE: 5 mm helical MDCT scan was obtained of the abdomen and pelvis. Sagittal and coronal images created from original data set. All CT scans at   this facility are performed using dose optimization techniques as appropriate to   a performed exam, to include automated exposure control, adjustment of the mA   and/or kV according to patient's size (including appropriate matching for site   specific examinations), or use of iterative reconstruction technique. COMPARISON: None. FINDINGS:        CT Of The Abdomen and pelvis:       Lung bases, heart and pericardium: Subsegmental opacity right lung base. No   effusions. Moderate burden coronary artery disease. .       Liver: Normal.       Gallbladder/biliary: Normal.       Spleen: Normal.       Pancreas: Normal.       Adrenals: Normal.       Kidneys/ureters: Normal.       Bowel: No hiatal hernia on the current study. Stomach and duodenum unremarkable. There are dilated proximal small bowel loops measuring up to 29 mm.  There is   distortion of the mesentery with stellate appearance in the right mid abdomen   and transition to collapsed small bowel (axial 41, coronal 25, sagittal 49). Distal small bowel loops than are collapsed. No bowel wall thickening. Patient had a right colectomy. There remains stool and air in the colon. Multiple left-sided diverticula are noted without associated bowel wall   thickening. Lymph nodes: Normal.       Peritoneal space: Normal.       Retroperitoneum: Normal.       : Enlarged uterus with multiple coarse calcifications compatible with   fibroids. Bladder not well distended but appears unremarkable. MSK/body wall: Multilevel advanced degenerative changes thoracolumbar spine. Grade 1 anterolisthesis L4 on L5. CXR Results  (Last 48 hours)      None            Medical Decision Making   I am the first provider for this patient. I reviewed the vital signs, available nursing notes, past medical history, past surgical history, family history and social history. Vital Signs-Reviewed the patient's vital signs. Patient Vitals for the past 12 hrs:   Temp Pulse Resp BP SpO2   07/21/22 1956 98.4 °F (36.9 °C) 78 19 (!) 168/90 98 %         EKG interpretation: (Preliminary)  Rhythm: normal sinus rhythm; and regular . Rate (approx.): 61; Axis: normal; CT interval: normal; QRS interval: normal ; ST/T wave: non-specific changes; Other findings: normal.    No acute ischemic changes. Records Reviewed: Nursing Notes, Old Medical Records, Previous Radiology Studies, and Previous Laboratory Studies    Provider Notes (Medical Decision Making):   DDx: GERD, PUD, Hiatal hernia, ACS, SBO    81 yo F who presents intermittent burning to epigastric area that began today. Denies cardiac history. On exam mild epigastric tenderness. History of a hiatal hernia. EKG shows no ischemic changes with negative troponin. CT scan shows SBO likely due to adhesions. UA shows WBC in urine. Urine culture sent.   Spoke with surgery who had several followed by inpatient. No NG tube placed at time of admission patient is not vomiting. ED Course:   Initial assessment performed. The patients presenting problems have been discussed, and they are in agreement with the care plan formulated and outlined with them. I have encouraged them to ask questions as they arise throughout their visit. 2350: Spoke with general surgeon Dr. Helen Shultz. Discussed patient's presentation and CT scan findings. Discussed that patient is not actively vomiting. She recommends n.p.o. which will follow inpatient. Troponins NG tube patient begins vomiting.  2358: Spoke with Dr Ita Martin, consult hospitalist.     Disposition:  Admitted    PLAN:  1. There are no discharge medications for this patient. 2.   Follow-up Information    None       Return to ED if worse     Diagnosis     Clinical Impression:   1. Small bowel obstruction ([address]        Attestations:    Lazarus Griffon, PA    Please note that this dictation was completed with [employer], the computer voice recognition software. Quite often unanticipated grammatical, syntax, homophones, and other interpretive errors are inadvertently transcribed by the computer software. Please disregard these errors. Please excuse any errors that have escaped final proofreading. Thank you.

## 2022-07-22 NOTE — CONSULTS
History & Physical    Date: 7/22/2022    Referring Physician: ER    Assessment:        Active Problems:    Small bowel obstruction ([address] (7/22/2022)        Plan:   I have discussed the above findings with Ms. Erick Zamora personally reviewed her CT scan of the abdomen and pelvis and independently interpreted these results as small bowel obstruction. She does have decompressed bowel and has some tenderness on exam although this is mild. Recommend we obtain a Gastrografin small bowel series today. If there is ongoing obstruction she would benefit from NG tube and attempt at conservative management. She agrees with the current plan. We can hold off on the NG tube until the Gastrografin is completed as her symptoms are relatively mild with no current nausea or vomiting. She appears to be able to drink the contrast.     History of Present Illness:  Ms. Gilmer Haq is a 82y.o. year old female who presented with epigastric pain that began shortly after arrival and eating. She did have some nausea but no vomiting. She did pass some flatus and had normal bowel movement yesterday. Does have a history of colectomy for colon cancer and underwent colonoscopy approximately 5 months ago and reports this was normal.  This morning she reports no abdominal pain, nausea or vomiting. She has not passed any flatus or had a bowel movements since admission. History:  Past medical history including hypertension, hernia  Surgical history including colectomy and 2 abdominal hernia repairs. No family history on file.   Social History     Socioeconomic History    Marital status:      Spouse name: Not on file    Number of children: Not on file    Years of education: Not on file    Highest education level: Not on file   Occupational History    Not on file   Tobacco Use    Smoking status: Not on file    Smokeless tobacco: Not on file   Substance and Sexual Activity    Alcohol use: Not on file    Drug use: Not on file    Sexual activity: Not on file   Other Topics Concern    Not on file   Social History Narrative    Not on file     Social Determinants of Health     Financial Resource Strain: Not on file   Food Insecurity: Not on file   Transportation Needs: Not on file   Physical Activity: Not on file   Stress: Not on file   Social Connections: Not on file   Intimate Partner Violence: Not on file   Housing Stability: Not on file     No Known Allergies    Medications:  No current facility-administered medications on file prior to encounter. No current outpatient medications on file prior to encounter. Review of Systems:  Review of Systems   Constitutional:  Positive for appetite change. Negative for activity change and fatigue. Respiratory:  Negative for chest tightness and shortness of breath. Cardiovascular:  Negative for chest pain. Gastrointestinal:  Positive for abdominal pain and nausea. Negative for anal bleeding, blood in stool, constipation, diarrhea and vomiting. Genitourinary:  Negative for difficulty urinating. Musculoskeletal:  Negative for back pain. Hematological:  Negative for adenopathy. Does not bruise/bleed easily. Physical Exam:  Patient Vitals for the past 24 hrs:   BP Temp Pulse Resp SpO2 Height Weight   07/22/22 0448 (!) 149/75 97.9 °F (36.6 °C) (!) 57 17 100 % -- --   07/22/22 0423 (!) 156/75 97 °F (36.1 °C) (!) 58 17 99 % -- --   07/21/22 1956 (!) 168/90 98.4 °F (36.9 °C) 78 19 98 % 5' 3\" (1.6 m) 77.1 kg (170 lb)       Physical Exam  Constitutional:       Appearance: Normal appearance. She is normal weight. HENT:      Head: Normocephalic and atraumatic. Eyes:      Extraocular Movements: Extraocular movements intact. Conjunctiva/sclera: Conjunctivae normal.      Pupils: Pupils are equal, round, and reactive to light. Cardiovascular:      Rate and Rhythm: Bradycardia present. Abdominal:      Palpations: Abdomen is soft. There is no mass. Tenderness:  There is abdominal tenderness. There is no guarding or rebound. Hernia: No hernia is present. Comments: Mild tenderness in the epigastric region and left upper quadrant with palpation   Neurological:      General: No focal deficit present. Mental Status: She is alert and oriented to person, place, and time. Mental status is at baseline. Psychiatric:         Mood and Affect: Mood normal.         Behavior: Behavior normal.         Thought Content: Thought content normal.         Judgment: Judgment normal.       Laboratory Data:  Recent Results (from the past 2016 hour(s))   CBC WITH AUTOMATED DIFF    Collection Time: 07/21/22  8:03 PM   Result Value Ref Range    WBC 3.9 (L) 4.6 - 13.2 K/uL    RBC 4.82 4.20 - 5.30 M/uL    HGB 14.2 12.0 - 16.0 g/dL    HCT 42.9 35.0 - 45.0 %    MCV 89.0 78.0 - 100.0 FL    MCH 29.5 24.0 - 34.0 PG    MCHC 33.1 31.0 - 37.0 g/dL    RDW 13.3 11.6 - 14.5 %    PLATELET 923 957 - 657 K/uL    MPV 10.5 9.2 - 11.8 FL    NRBC 0.0 0  WBC    ABSOLUTE NRBC 0.00 0.00 - 0.01 K/uL    NEUTROPHILS 49 40 - 73 %    LYMPHOCYTES 30 21 - 52 %    MONOCYTES 12 (H) 3 - 10 %    EOSINOPHILS 9 (H) 0 - 5 %    BASOPHILS 1 0 - 2 %    IMMATURE GRANULOCYTES 0 0.0 - 0.5 %    ABS. NEUTROPHILS 1.9 1.8 - 8.0 K/UL    ABS. LYMPHOCYTES 1.2 0.9 - 3.6 K/UL    ABS. MONOCYTES 0.5 0.05 - 1.2 K/UL    ABS. EOSINOPHILS 0.3 0.0 - 0.4 K/UL    ABS. BASOPHILS 0.0 0.0 - 0.1 K/UL    ABS. IMM.  GRANS. 0.0 0.00 - 0.04 K/UL    DF AUTOMATED     METABOLIC PANEL, COMPREHENSIVE    Collection Time: 07/21/22  8:03 PM   Result Value Ref Range    Sodium 141 136 - 145 mmol/L    Potassium 3.5 3.5 - 5.5 mmol/L    Chloride 106 100 - 111 mmol/L    CO2 31 21 - 32 mmol/L    Anion gap 4 3.0 - 18 mmol/L    Glucose 99 74 - 99 mg/dL    BUN 12 7.0 - 18 MG/DL    Creatinine 0.94 0.6 - 1.3 MG/DL    BUN/Creatinine ratio 13 12 - 20      GFR est AA >60 >60 ml/min/1.73m2    GFR est non-AA 57 (L) >60 ml/min/1.73m2    Calcium 9.1 8.5 - 10.1 MG/DL    Bilirubin, total 1.0 0.2 - 1.0 MG/DL    ALT (SGPT) 28 13 - 56 U/L    AST (SGOT) 39 (H) 10 - 38 U/L    Alk.  phosphatase 73 45 - 117 U/L    Protein, total 7.7 6.4 - 8.2 g/dL    Albumin 3.7 3.4 - 5.0 g/dL    Globulin 4.0 2.0 - 4.0 g/dL    A-G Ratio 0.9 0.8 - 1.7     LIPASE    Collection Time: 07/21/22  8:03 PM   Result Value Ref Range    Lipase 167 73 - 393 U/L   TROPONIN-HIGH SENSITIVITY    Collection Time: 07/21/22  8:03 PM   Result Value Ref Range    Troponin-High Sensitivity 8 0 - 54 ng/L   MAGNESIUM    Collection Time: 07/21/22  8:03 PM   Result Value Ref Range    Magnesium 2.4 1.6 - 2.6 mg/dL   URINALYSIS W/ RFLX MICROSCOPIC    Collection Time: 07/21/22  8:05 PM   Result Value Ref Range    Color YELLOW      Appearance CLEAR      Specific gravity 1.010 1.005 - 1.030      pH (UA) 7.0 5.0 - 8.0      Protein Negative NEG mg/dL    Glucose Negative NEG mg/dL    Ketone Negative NEG mg/dL    Bilirubin Negative NEG      Blood Negative NEG      Urobilinogen 1.0 0.2 - 1.0 EU/dL    Nitrites Negative NEG      Leukocyte Esterase MODERATE (A) NEG     URINE MICROSCOPIC ONLY    Collection Time: 07/21/22  8:05 PM   Result Value Ref Range    WBC 4 to 10 0 - 4 /hpf    RBC Negative 0 - 5 /hpf    Epithelial cells 1+ 0 - 5 /lpf    Bacteria Negative NEG /hpf   EKG, 12 LEAD, INITIAL    Collection Time: 07/21/22  9:10 PM   Result Value Ref Range    Ventricular Rate 61 BPM    Atrial Rate 61 BPM    P-R Interval 182 ms    QRS Duration 94 ms    Q-T Interval 390 ms    QTC Calculation (Bezet) 392 ms    Calculated P Axis 18 degrees    Calculated R Axis -37 degrees    Calculated T Axis 49 degrees    Diagnosis       Normal sinus rhythm with sinus arrhythmia  Left axis deviation  Nonspecific T wave abnormality  Abnormal ECG  No previous ECGs available     PROTHROMBIN TIME + INR    Collection Time: 07/22/22  4:50 AM   Result Value Ref Range    Prothrombin time 15.0 11.5 - 15.2 sec    INR 1.1 0.8 - 1.2         Diagnostic Studies:  Narrative & Impression   CT Of The Abdomen And Pelvis Without Contrast     CPT CODE 98075,[id number]     CLINICAL HISTORY: Epigastric pain in a patient with history of hiatal hernia. .     TECHNIQUE: 5 mm helical MDCT scan was obtained of the abdomen and pelvis. Sagittal and coronal images created from original data set. All CT scans at  this facility are performed using dose optimization techniques as appropriate to  a performed exam, to include automated exposure control, adjustment of the mA  and/or kV according to patient's size (including appropriate matching for site  specific examinations), or use of iterative reconstruction technique. COMPARISON: None. FINDINGS:     CT Of The Abdomen and pelvis:     Lung bases, heart and pericardium: Subsegmental opacity right lung base. No  effusions. Moderate burden coronary artery disease. .     Liver: Normal.     Gallbladder/biliary: Normal.     Spleen: Normal.     Pancreas: Normal.     Adrenals: Normal.     Kidneys/ureters: Normal.     Bowel: No hiatal hernia on the current study. Stomach and duodenum unremarkable. There are dilated proximal small bowel loops measuring up to 29 mm. There is  distortion of the mesentery with stellate appearance in the right mid abdomen  and transition to collapsed small bowel (axial 41, coronal 25, sagittal 49). Distal small bowel loops than are collapsed. No bowel wall thickening. Patient had a right colectomy. There remains stool and air in the colon. Multiple left-sided diverticula are noted without associated bowel wall  thickening. Lymph nodes: Normal.     Peritoneal space: Normal.     Retroperitoneum: Normal.     : Enlarged uterus with multiple coarse calcifications compatible with  fibroids. Bladder not well distended but appears unremarkable. MSK/body wall: Multilevel advanced degenerative changes thoracolumbar spine. Grade 1 anterolisthesis L4 on L5.         IMPRESSION     Small bowel obstruction right midabdomen probably related to adhesions. Enlarged fibroid uterus. Diverticulosis coli. Status post right colectomy.        Valencia Sharp, [address]

## 2022-07-22 NOTE — ROUTINE PROCESS
[facility] called and said that if ngt is place, not to connect to suction because contrast will be pulled out.

## 2022-07-22 NOTE — PROGRESS NOTES
D/C order noted for today. Orders reviewed. No needs identified at this time. CM remains available if needed.       Timothy Pablo, MSW, QMHP

## 2022-07-22 NOTE — H&P
History and Physical    Patient: Liliana Matthews MRN: [id number]  SSN: [id number]    YOB: 1935  Age: 82 y.o. Sex: female      Subjective:      Liliana Matthews is a 82 y.o. female who presents to [facility] ER with complaint of Abdominal Pain. Patient reports that she began to have a reduced appetite and abdominal discomfort 3 days ago. Patient reports that the low abdominal discomfort became upper abdominal pain of 9-10/10 intensity, intermittent \"burning\" over the last 24 hours. Patient reports that her last BM was in the last 24 hours and was diarrhea-like. Patient reports that her last flatus was yesterday, as well. Patient reports that she has been having a cough that was productive of a small amount of mostly clear mucus. Patient reports previous Abdominal Surgeries of Colon Resection and Abdominal Hernia Repairs x2. Patient remarks that she has a bus ticket to travel to [place] with several minors later today (7/22/2022). As such, Patient is planning to [employer] [address] [place] [address]) later today to be able to catch the bus and take the children to [place]. As per agreement with Patient, Patient will receive IV Fluids until she leaves Against Medical Advice (AMA) and we agreed to place an NG Tube to decompress her stomach so that she is in theoretically less danger should she begin vomiting after discharge. General Surgical services were consulted by [facility] ER Physician/Clinician in [facility] ER. Patient is admitted to [facility] Telemetry Unit for management of Small Bowel Obstruction. No past medical history on file. No past surgical history on file. No family history on file.   Social History     Tobacco Use    Smoking status: Not on file    Smokeless tobacco: Not on file   Substance Use Topics    Alcohol use: Not on file      Prior to Admission medications    Not on File        No Known Allergies    Review of Systems:  (+) Loss of Appetite  (-) Fevers  (-) Chills  (-) Cough  (-) Increased Sputum Production  (-) Chest Pain  (+) Abdominal Pain  (-) Nausea  (-) Vomiting  (-) Diarrhea  (+) Constipation  (-) Dysuria  All other systems have been reviewed and are negative      Objective:     Vitals:    07/21/22 1956 07/22/22 0423 07/22/22 0448   BP: (!) 168/90 (!) 156/75 (!) 149/75   Pulse: 78 (!) 58 (!) 57   Resp: 19 17 17   Temp: 98.4 °F (36.9 °C) 97 °F (36.1 °C) 97.9 °F (36.6 °C)   SpO2: 98% 99% 100%   Weight: 77.1 kg (170 lb)     Height: 5' 3\" (1.6 m)          Physical Exam:  General:  Adult female lying in bed in no acute distress  HEENT:  Atraumatic, normocephalic; (+) Glasses in place; Pupils equally round and reactive to light with accommodation; Extraocular muscles intact; Moist Oropharynx without erythema, edema, or exudates  Chest:  No pectus carinatum; No pectus excavatum  Cardiovascular:  Regular rate and rhythm without rubs, gallops, or murmurs  Respiratory:  Clear to Auscultation Bilaterally without wheezes, rales, or rhonchi; normal effort of breathing  Abdominal:  Soft, non-tense, (+) Diffusely Tender with Moderate Tenderness in LUQ and Epigastrium; BS present without guarding, rebound, or masses  :  Deferred  Extremities:  Pulses 2+ x4 without edema, clubbing, or cyanosis  Musculoskeletal:  Strength 5/5 and symmetrical in BUE and BLE  Integument:  No rash on face, forearms, or legs  Neurological:  Alert & Ostensibly Oriented x4/4; No gross deficits of Visual Acuity, Eye Movement, Jaw Opening, Facial Expression, Hearing, Phonation, or Head Movement;  No gross deficits of Tongue Movement or Slurring of Speech  Psychiatric:  Affect is appropriate; Language is present and fluent; Behavior is appropriate      Laboratory Studies:  CMP:   Lab Results   Component Value Date/Time     07/21/2022 08:03 PM    K 3.5 07/21/2022 08:03 PM     07/21/2022 08:03 PM    CO2 31 07/21/2022 08:03 PM    AGAP 4 07/21/2022 08:03 PM    GLU 99 07/21/2022 08:03 PM    BUN 12 07/21/2022 08:03 PM CREA 0.94 07/21/2022 08:03 PM    GFRAA >60 07/21/2022 08:03 PM    GFRNA 57 (L) 07/21/2022 08:03 PM    CA 9.1 07/21/2022 08:03 PM    MG 2.4 07/21/2022 08:03 PM    ALB 3.7 07/21/2022 08:03 PM    TP 7.7 07/21/2022 08:03 PM    GLOB 4.0 07/21/2022 08:03 PM    AGRAT 0.9 07/21/2022 08:03 PM    ALT 28 07/21/2022 08:03 PM     CBC:   Lab Results   Component Value Date/Time    WBC 3.9 (L) 07/21/2022 08:03 PM    HGB 14.2 07/21/2022 08:03 PM    HCT 42.9 07/21/2022 08:03 PM     07/21/2022 08:03 PM     All Cardiac Markers in the last 24 hours: No results found for: CPK, CK, CKMMB, CKMB, RCK3, CKMBT, CKNDX, CKND1, KOKI, TROPT, TROIQ, SANDRA, TROPT, TNIPOC, BNP, BNPP  Recent Glucose Results:   Lab Results   Component Value Date/Time    GLU 99 07/21/2022 08:03 PM     COAGS:   Lab Results   Component Value Date/Time    PTP 15.0 07/22/2022 04:50 AM    INR 1.1 07/22/2022 04:50 AM        Images Reviewed:  CT ABD PELV WO CONT    Result Date: 7/21/2022  CT Of The Abdomen And Pelvis Without Contrast CPT CODE 77107,28861 CLINICAL HISTORY: Epigastric pain in a patient with history of hiatal hernia. . TECHNIQUE: 5 mm helical MDCT scan was obtained of the abdomen and pelvis. Sagittal and coronal images created from original data set. All CT scans at this facility are performed using dose optimization techniques as appropriate to a performed exam, to include automated exposure control, adjustment of the mA and/or kV according to patient's size (including appropriate matching for site specific examinations), or use of iterative reconstruction technique. COMPARISON: None. FINDINGS: CT Of The Abdomen and pelvis: Lung bases, heart and pericardium: Subsegmental opacity right lung base. No effusions. Moderate burden coronary artery disease. . Liver: Normal. Gallbladder/biliary: Normal. Spleen: Normal. Pancreas: Normal. Adrenals: Normal. Kidneys/ureters: Normal. Bowel: No hiatal hernia on the current study. Stomach and duodenum unremarkable.  There are dilated proximal small bowel loops measuring up to 29 mm. There is distortion of the mesentery with stellate appearance in the right mid abdomen and transition to collapsed small bowel (axial 41, coronal 25, sagittal 49). Distal small bowel loops than are collapsed. No bowel wall thickening. Patient had a right colectomy. There remains stool and air in the colon. Multiple left-sided diverticula are noted without associated bowel wall thickening. Lymph nodes: Normal. Peritoneal space: Normal. Retroperitoneum: Normal. : Enlarged uterus with multiple coarse calcifications compatible with fibroids. Bladder not well distended but appears unremarkable. MSK/body wall: Multilevel advanced degenerative changes thoracolumbar spine. Grade 1 anterolisthesis L4 on L5. Small bowel obstruction right midabdomen probably related to adhesions. Enlarged fibroid uterus. Diverticulosis coli. Status post right colectomy. Assessment:     Hospital Problems  Never Reviewed            Codes Class Noted POA    Small bowel obstruction ([facility] [address] ICD-10-CM: S57.194  ICD-9-CM: 560.9  7/22/2022 Unknown           Plan:     Small Bowel Obstruction  Telemetry, NPO (except medications), PRN Antiemetics, PRN Pain Control, and PRN Naloxone. General Surgical services were consulted by [facility] ER Physician/Clinician in [facility] ER. Notably, Patient plans to Leave Against Medical Advice [facility]) later today after she has received sufficient IV Fluids and had some decompression with NG Tube in place and set to low intermittent suction. Refusal of Medical Treatment Paperwork is in Patient's Physical Chart and is ready to be signed---Risks and Consequences of abandoning medical therapy this visit have already been explained to Patient while in [facility] ER. DVT Prophylaxis  DVT mechanoprophylaxis is achieved with SCDs.     Signed By: Sigrid Zhu DO     July 22, 2022

## 2022-07-22 NOTE — PROGRESS NOTES
The  called the patient's room to conduct their initial assessment due to the patient's COVID-19 precautions. However, there was no reply from the patient's room. Therefore, the  will attempt to contact the patient within the next couple of hours.     LAUREEN Lanza, GOLDHP

## 2022-07-22 NOTE — PROGRESS NOTES
Problem: Self Care Deficits Care Plan (Adult)  Goal: *Acute Goals and Plan of Care (Insert Text)  Outcome: Resolved/Met       OCCUPATIONAL THERAPY EVALUATION/DISCHARGE    Patient: Rishi Walker (89 y.o. female)  Date: 7/22/2022  Primary Diagnosis: Small bowel obstruction ([facility] [address] [K56.609]  Precautions:   (standard)  PLOF: Patient was independent with self-care and functional mobility PTA. Patient has a cane that she uses prn for community reintegration depending on sciatica. ASSESSMENT AND RECOMMENDATIONS:  Based on the objective data described below, the patient presents with no deficits that impede pt function with ADLs, functional transfers, and functional mobility in preparation for selfcare tasks. At this time patient is safe to d/c home with family support from selfcare standpoint. OT to d/c from caseload. Skilled occupational therapy is not indicated at this time. Discharge Recommendations: Home   Further Equipment Recommendations for Discharge: N/A    [facility]: Rishi Walker scored a 24/24 on the AM-PAC ADL Inpatient form. At this time, no further OT is recommended upon discharge due to patient at functional baseline for ADLs     SUBJECTIVE:   Patient stated Can you unplug this so I can go to the bathroom referring to IV    OBJECTIVE DATA SUMMARY:   No past medical history on file. No past surgical history on file.   Barriers to Learning/Limitations: None  Compensate with: visual, verbal, tactile, kinesthetic cues/model    Home Situation:   Home Situation  Home Environment: Private residence  # Steps to Enter: 0  One/Two Story Residence: One story  Living Alone: No  Support Systems: Child(joey)  Patient Expects to be Discharged to[de-identified] Home  Current DME Used/Available at Home: Cane, straight  []     Right hand dominant   []     Left hand dominant    Cognitive/Behavioral Status:  Neurologic State: Alert  Orientation Level: Oriented X4  Cognition: Follows commands  Safety/Judgement: Awareness of environment    Skin: Intact  Edema: None noted    Vision/Perceptual:      Acuity: Within Defined Limits    Corrective Lenses: Glasses    Coordination: BUE  Fine Motor Skills-Upper: Left Intact; Right Intact    Gross Motor Skills-Upper: Left Intact; Right Intact    Balance:  Sitting: Intact  Standing: Intact    Strength: BUE  Strength: Within functional limits     Tone & Sensation: BUE  Tone: Normal  Sensation: Intact     Range of Motion: BUE  AROM: Within functional limits    Functional Mobility and Transfers for ADLs:  Bed Mobility:  Sit to Supine: Independent  Scooting: Independent    Transfers:  Sit to Stand: Independent  Stand to Sit: Independent   Toilet Transfer : Independent     ADL Assessment:  Feeding: Independent    Oral Facial Hygiene/Grooming: Independent    Bathing: Independent    Upper Body Dressing: Independent    Lower Body Dressing: Independent    Toileting: Independent    ADL Intervention:  Grooming  Grooming Assistance: Independent  Position Performed: Standing  Washing Hands: Independent    Toileting  Toileting Assistance: Independent  Bladder Hygiene: Independent    Cognitive Retraining  Safety/Judgement: Awareness of environment    Pain:  Pain level pre-treatment: 0/10 , abdomen- just discomfort  Pain level post-treatment: 0/10   Pain Intervention(s): Medication (see MAR); Rest, Ice, Repositioning   Response to intervention: Nurse notified, See doc flow    Activity Tolerance:   Good      Please refer to the flowsheet for vital signs taken during this treatment. After treatment:   []  Patient left in no apparent distress sitting up in chair  [x]  Patient left in no apparent distress in bed  [x]  Call bell left within reach  [x]  Nursing notified  []  Caregiver present  []  Bed alarm activated    COMMUNICATION/EDUCATION:   [x]      Role of Occupational Therapy in the acute care setting  [x]      Home safety education was provided and the patient/caregiver indicated understanding.   [x] Patient/family have participated as able and agree with findings and recommendations. []      Patient is unable to participate in plan of care at this time. Thank you for this referral.  Delores Bailey OTR/L  Time Calculation: 8 mins      Eval Complexity: History: MEDIUM Complexity : Expanded review of history including physical, cognitive and psychosocial  history ; Examination: LOW Complexity : 1-3 performance deficits relating to physical, cognitive , or psychosocial skils that result in activity limitations and / or participation restrictions ; Decision Making:LOW Complexity : No comorbidities that affect functional and no verbal or physical assistance needed to complete eval tasks     [facility]® Daily Activity Inpatient Short Form (6-Clicks)*    How much HELP from another person does the patient currently need    (If the patient hasn't done an activity recently, how much help from another person do you think he/she would need if he/she tried?)   Total (Total A or Dep)   A Lot  (Mod to Max A)   A Little (Sup or Min A)   None (Mod I to I)   Putting on and taking off regular lower body clothing? [] 1 [] 2 [] 3 [x] 4   2. Bathing (including washing, rinsing,      drying)? [] 1 [] 2 [] 3 [x] 4   3. Toileting, which includes using toilet, bedpan or urinal?   [] 1 [] 2 [] 3 [x] 4   4. Putting on and taking off regular upper body clothing? [] 1 [] 2 [] 3 [x] 4   5. Taking care of personal grooming such as brushing teeth? [] 1 [] 2 [] 3 [x] 4   6. Eating meals?    [] 1 [] 2 [] 3 [x] 4

## 2022-07-22 NOTE — PROGRESS NOTES
Virtual reviewer communicated change to CM, which reflect outpatient observation order written prior to Written discharge order. Code 44 delivered and given to patient. CM met with the patient and provided to the patient the printed information about her outpatient observation status. The patient was given the flyer entitled, \"Medicare Outpatient Observation: Information & notification. \" All questions were answered, no additional discharge needs identified at this time and patient expects to return to their home,  after discharge today. Copy provided to patient.     LAUREEN Charles, QMHP

## 2022-07-22 NOTE — PROGRESS NOTES
PT orders received and chart reviewed. Worked with OT prior. Reports amb in room without difficulties. Denies history of falls. Denies mobility concerns with discharge home. Formal PT evaluation not indicated. Discontinuing PT orders.

## 2022-07-22 NOTE — PROGRESS NOTES
OT order received and chart reviewed. Patient off the unit for testing, radiology. Will continue to follow and see patient when available/as appropriate.           Thank you for this referral,   Herrera Lam MS, OTR/L

## 2022-07-23 LAB
BACTERIA SPEC CULT: NORMAL
SERVICE CMNT-IMP: NORMAL

## 2022-08-31 ENCOUNTER — APPOINTMENT (OUTPATIENT)
Dept: GASTROENTEROLOGY | Facility: CLINIC | Age: 87
End: 2022-08-31

## 2022-08-31 DIAGNOSIS — Z01.818 ENCOUNTER FOR OTHER PREPROCEDURAL EXAMINATION: ICD-10-CM

## 2022-08-31 PROCEDURE — 99204 OFFICE O/P NEW MOD 45 MIN: CPT

## 2022-08-31 NOTE — REASON FOR VISIT
[Home] : at home, [unfilled] , at the time of the visit. [Medical Office: ([facility])___] : at the medical office located in  [Patient] : the patient [This encounter was initiated by telehealth (audio with video) and converted to telephone (audio only) due to technical difficulties.] : This encounter was initiated by telehealth (audio with video) and converted to telephone (audio only) due to technical difficulties.

## 2022-09-07 NOTE — ASSESSMENT
[FreeTextEntry1] : This is an 86 year old female here for an evaluation of 0.8cm pancreatic cyst.\par Ddx IPMN or other cystic lesion, Offered 3-6 month MRI surveillance vs EUS surveillance. Explained to the patient risks and benefits. She wants to go ahead with EUS due to issues with MRI\par \par My plan\par EUS \par covid swab\par Medical clearance from PCP\par \par Risks, benefits, and alternatives of EUS discussed at length with patient including but not limited to bleeding, perforation, anesthesia related complication, aspiration, etc. Patient expressed understanding.\par \par

## 2022-09-07 NOTE — HISTORY OF PRESENT ILLNESS
[de-identified] : This is an 86 year old female here for an evaluation of 0.8cm pancreatic cyst. Referred to us by Dr. Vaughn. PMH of HLD, HTN, osteoporosis and GERD, hital hernia,SBO,  colon cancer s/p colon resection with chemo 1998, umbilical hernia repair, left inguinal hernia, s/p goiter removal 1959. Father and sister with lung cancer. Labs from 7/22/22 Tbili 0.7 ALk Phos 66 ALT 40 ALT 28 Lipase 66. MRI from 8/8/22 with 0.8cm pancreatic body cyst with no pancreatic duct dilation.  Denies any difficulty swallowing. Intermittent reflux and takes medication with help. Denies N&V. Intermittent abdominal discomfort. No blood or dark stools. Normal caliber. Last colonoscopy 3 months ago and normal.  Of note, patient was in [place] last month and went to the ED with abdominal pain and diagnoses with SBO. SBO was self-limiting. \par Smoke:none\par Etoh: social \par Med: No AC or Antiplatelet. Chest in chart

## 2022-09-07 NOTE — PHYSICAL EXAM
[General Appearance - Alert] : alert [General Appearance - In No Acute Distress] : in no acute distress [Bowel Sounds] : normal bowel sounds [Abdomen Soft] : soft [Abdomen Tenderness] : non-tender [Abdomen Mass (___ Cm)] : no abdominal mass palpated [Abnormal Walk] : normal gait [Nail Clubbing] : no clubbing  or cyanosis of the fingernails [Musculoskeletal - Swelling] : no joint swelling seen [Motor Tone] : muscle strength and tone were normal [Skin Color & Pigmentation] : normal skin color and pigmentation [Skin Turgor] : normal skin turgor [] : no rash [Deep Tendon Reflexes (DTR)] : deep tendon reflexes were 2+ and symmetric [Sensation] : the sensory exam was normal to light touch and pinprick [No Focal Deficits] : no focal deficits [Oriented To Time, Place, And Person] : oriented to person, place, and time [Impaired Insight] : insight and judgment were intact [Affect] : the affect was normal [FreeTextEntry1] : Uses cane

## 2022-09-20 ENCOUNTER — APPOINTMENT (OUTPATIENT)
Dept: GASTROENTEROLOGY | Facility: HOSPITAL | Age: 87
End: 2022-09-20

## 2022-09-20 LAB — SARS-COV-2 N GENE NPH QL NAA+PROBE: NOT DETECTED

## 2022-10-26 ENCOUNTER — NON-APPOINTMENT (OUTPATIENT)
Age: 87
End: 2022-10-26

## 2022-10-28 ENCOUNTER — APPOINTMENT (OUTPATIENT)
Dept: GASTROENTEROLOGY | Facility: CLINIC | Age: 87
End: 2022-10-28

## 2022-10-28 VITALS
WEIGHT: 169 LBS | TEMPERATURE: 97.6 F | HEIGHT: 63 IN | OXYGEN SATURATION: 95 % | SYSTOLIC BLOOD PRESSURE: 149 MMHG | BODY MASS INDEX: 29.95 KG/M2 | HEART RATE: 64 BPM | DIASTOLIC BLOOD PRESSURE: 83 MMHG | RESPIRATION RATE: 16 BRPM

## 2022-10-28 PROCEDURE — 99213 OFFICE O/P EST LOW 20 MIN: CPT

## 2022-10-28 NOTE — ASSESSMENT
[FreeTextEntry1] : 86F with pmhx of HTN, HLD, osteoporosis, GERD, pancreatic cyst, colon cancer s/p colon resection and chemo (1998) presenting for follow-up of pancreatic cyst. Referred by Dr. Vaughn. Had MRI showing a 0.8cm cyst in the body of the pancreas. No pancreatic ductal dilation or mural nodule noted. Pt was scheduled for an EUS to further evaluate however did not have COVID testing in time for procedure. Pt now here for follow-up. Denies any complaints other than leg discomfort from known venous issues (undergoing ongoing w/u). \par - MRI images reviewed, small subcentimeter cyst in the pancreas without concerning features, no ductal dilation or mural nodule. Discussed natural hx of pancreatic cysts with some like IPMNs with precancerous potential but others being benign. Given the current cysts small size without concerning features, reasonable to opt for imaging surveillance imaging rather than EUS.\par - Will obtain repeat MRI now, discussed if stable, likely MRI in 1 year for surveillance. \par \par CC: Dr. Vaughn

## 2022-10-28 NOTE — PHYSICAL EXAM

## 2022-10-28 NOTE — HISTORY OF PRESENT ILLNESS
[FreeTextEntry1] : 86F with pmhx of HTN, HLD, osteoporosis, GERD, pancreatic cyst, colon cancer s/p colon resection and chemo (1998) presenting for follow-up of pancreatic cyst. Referred by Dr. Vaughn. Had MRI showing a 0.8cm cyst in the body of the pancreas. No pancreatic ductal dilation or mural nodule noted. Pt was scheduled for an EUS to further evaluate however did not have COVID testing in time. Pt now here for follow-up. Denies any complaints other than leg discomfort from known venous issues (undergoing ongoing w/u). Denies any abd pain, n/v/d/c, melena, hematochezia, fever/chills, or other issue.

## 2022-10-31 ENCOUNTER — APPOINTMENT (OUTPATIENT)
Dept: ORTHOPEDIC SURGERY | Facility: CLINIC | Age: 87
End: 2022-10-31

## 2022-10-31 VITALS — WEIGHT: 169 LBS | BODY MASS INDEX: 29.95 KG/M2 | HEIGHT: 63 IN

## 2022-10-31 DIAGNOSIS — M17.11 UNILATERAL PRIMARY OSTEOARTHRITIS, RIGHT KNEE: ICD-10-CM

## 2022-10-31 DIAGNOSIS — M25.561 PAIN IN RIGHT KNEE: ICD-10-CM

## 2022-10-31 LAB
CANCER AG19-9 SERPL-ACNC: 12 U/ML
CEA SERPL-MCNC: 1.5 NG/ML

## 2022-10-31 PROCEDURE — 73564 X-RAY EXAM KNEE 4 OR MORE: CPT | Mod: RT

## 2022-10-31 PROCEDURE — 72110 X-RAY EXAM L-2 SPINE 4/>VWS: CPT

## 2022-10-31 PROCEDURE — 72170 X-RAY EXAM OF PELVIS: CPT

## 2022-10-31 PROCEDURE — 99214 OFFICE O/P EST MOD 30 MIN: CPT

## 2022-10-31 NOTE — HISTORY OF PRESENT ILLNESS
[Lower back] : lower back [8] : 8 [1] : 2 [de-identified] : 7/19/19: Ms. Ella Coon, a 83-year-old female, presents today for fu lower back - plan at last was "would\par reinintiaite PT at this point for tx of compression fracture and have f/u in a month or so - if not getting better then\par would order MRI in order to assess the healing" - overall doing about the same - going down the left leg - right leg is\par okay - no loss of bb control - USES CANE - at times uses gabapentin - no PT currently it was helpful for her - using\par brace at times \par xrays today:\par L spine 4 views - DS at L4-5, compression deformity at L4, slight slip AT l5-s1, scoliosis in the lumbar\par AP pelvis -no obvious fx\par ---\par 12/4/20: Patient presents with upper and mid back pain for roughly 2 weeks. No specific injury. Pain started in the mid\par back, with tightness and spasm like pain, and has now extended into the lower back. Notes radicular pain in both legs.\par She has been taking Gabapentin with little relief. She saw Dr. Velasco last week, who prescribed MDP (mild improvement)\par and ordered MRIs.\par MRI T spine: Severe disc space narrowing and severe edematous endplate changes at T10-T11 with mild bone edema.\par No acute fracture. No compression deformity.\par Severe multilevel degenerative disc disease throughout the entire thoracic spine including disc space narrowing and\par vertebral endplate changes most severe at the mid thoracic spine.\par Mildly ectatic ascending aorta noted up to 3.7 cm. Heterogeneous left thyroid gland with suggestion of nodules and a\par right thyroidectomy. Consider thyroid ultrasound.\par Multilevel disc bulges from T1 through T12 impinging the thecal sac. No focal disc herniation. No compression of the\par spinal cord.\par MRI L spine: 1. No acute fracture. Mild chronic loss of height of the superior endplates of L3 and L4 with 20% loss of\par height.\par 2. Moderate to severe multilevel degenerative disc disease.\par 3. Incompletely imaged enlarged either multifibroid or adenomatous uterus. Recommend follow up pelvic ultrasound.\par She is the aware of the thyroid and the fibrid uterus findings\par \par She is the aware of the thyroid and the fibrid uterus findings\par 1/15/21: Here for fu - plan at last was "reviewed the MRI - has what looks like mild acute compression \par and T11 and also DS with stenosis L4-5 and stenosis L5-S1 - we discussed tx options - she isn't intere\par at this point - rec PT - shes tried pain management without relief- Tylenol #3 script" - having pain in th\par at this point and into the tailbone - the Tylenol #3 - the middle back not too bad - no PT so far \par xrays today:\par T spine 2 views - no new fracture \par L spine 2 views - DS at l4-5\par AP pelvis - no obvious fx\par \par 6/5/21: Here for pain in the back and in the right knee - SHE FELL and hit her chin - was about 4 days \par the pain in the right deep posterior buttock and down the right leg \par gabapentin helps \par xrays today:\par t spine - new new fracture \par L spine - no new fracture \par AP pelvis - no obvious fx\par r KNEE - 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 Was seen by PMD who referred her to vascular and being treated with laser. No known PVD. Pain in left leg constant but much worse with walking and standing and better with rest and elevation. Gabapentin qhs with help sleeping. \par No similar pain in past. Back pain intermittently. No BB dysfunction, no foot drop. \par \par Hx of comnpression fractures but this is diff pain. \par also right knee instability at times. \par \par xrays today:\par L spine - ds at L4-5, diffuse spondylosis, loss of disc hieght \par AP PELVIS - hip narrowing \par r KNEE - OA IN THE KNEE

## 2022-10-31 NOTE — DISCUSSION/SUMMARY
[de-identified] : worsening shooting down the legs - indicated for MRi of the L spine \par fu to review the MRi

## 2023-01-09 ENCOUNTER — APPOINTMENT (OUTPATIENT)
Dept: GASTROENTEROLOGY | Facility: CLINIC | Age: 88
End: 2023-01-09
Payer: MEDICARE

## 2023-01-09 VITALS
HEART RATE: 64 BPM | TEMPERATURE: 98.2 F | RESPIRATION RATE: 16 BRPM | DIASTOLIC BLOOD PRESSURE: 81 MMHG | BODY MASS INDEX: 29.95 KG/M2 | WEIGHT: 169 LBS | HEIGHT: 63 IN | SYSTOLIC BLOOD PRESSURE: 154 MMHG | OXYGEN SATURATION: 96 %

## 2023-01-09 DIAGNOSIS — K86.2 CYST OF PANCREAS: ICD-10-CM

## 2023-01-09 PROCEDURE — 99213 OFFICE O/P EST LOW 20 MIN: CPT

## 2023-01-09 NOTE — PHYSICAL EXAM
[Alert] : alert [Normal Voice/Communication] : normal voice/communication [Healthy Appearing] : healthy appearing [No Acute Distress] : no acute distress [Sclera] : the sclera and conjunctiva were normal [Hearing Threshold Finger Rub Not [place]] : hearing was normal [Normal Lips/Gums] : the lips and gums were normal [Oropharynx] : the oropharynx was normal [Normal Appearance] : the appearance of the neck was normal [No Neck Mass] : no neck mass was observed [No Respiratory Distress] : no respiratory distress [No Acc Muscle Use] : no accessory muscle use [Respiration, Rhythm And Depth] : normal respiratory rhythm and effort [Auscultation Breath Sounds / Voice Sounds] : lungs were clear to auscultation bilaterally [Heart Rate And Rhythm] : heart rate was normal and rhythm regular [Normal S1, S2] : normal S1 and S2 [Murmurs] : no murmurs [Bowel Sounds] : normal bowel sounds [Abdomen Tenderness] : non-tender [No Masses] : no abdominal mass palpated [Abdomen Soft] : soft [] : no hepatosplenomegaly [Oriented To Time, Place, And Person] : oriented to person, place, and time

## 2023-01-09 NOTE — HISTORY OF PRESENT ILLNESS
[FreeTextEntry1] : 87F with pmhx of HTN, HLD, osteoporosis, GERD, pancreatic cyst, colon cancer s/p colon resection and chemo (1998) presenting for follow-up of pancreatic cyst. Follows with Dr. Vaughn. Initial MRI 8/2022 showed pancreatic cysts. Had surveillance MRI 12/2022 (R) showing subcentimeter cysts, largest measuring 0.8 cm, stable from prior and no mural nodule or pancreatic ductal dilation. Pt reports feeling well other than LE pain from venous issues (following with vascular surgery). Denies any abd pain, n/v/d/c, melena, hematochezia, fever/chills, jaundice, or other issue.

## 2023-01-09 NOTE — ASSESSMENT
[FreeTextEntry1] : 87F with pmhx of HTN, HLD, osteoporosis, GERD, pancreatic cyst, colon cancer s/p colon resection and chemo (1998) presenting for follow-up of pancreatic cyst. Follows with Dr. Vaughn. Initial MRI 8/2022 showed pancreatic cysts. Had surveillance MRI 12/2022 (R) showing subcentimeter cysts, largest measuring 0.8 cm, stable from prior and no mural nodule or pancreatic ductal dilation.\par - Given small cysts without high risk features, stable on most recent MR, no need for EUS at this time.\par - RTC 1 year and can plan for surveillance MRI at that time. \par \par CC: Dr. Vaughn

## 2023-01-21 ENCOUNTER — APPOINTMENT (OUTPATIENT)
Dept: ORTHOPEDIC SURGERY | Facility: CLINIC | Age: 88
End: 2023-01-21
Payer: MEDICARE

## 2023-01-21 VITALS — WEIGHT: 170 LBS | HEIGHT: 63 IN | BODY MASS INDEX: 30.12 KG/M2

## 2023-01-21 DIAGNOSIS — I10 ESSENTIAL (PRIMARY) HYPERTENSION: ICD-10-CM

## 2023-01-21 PROCEDURE — 99214 OFFICE O/P EST MOD 30 MIN: CPT

## 2023-01-21 RX ORDER — CELECOXIB 200 MG/1
200 CAPSULE ORAL
Qty: 60 | Refills: 1 | Status: ACTIVE | COMMUNITY
Start: 2023-01-21 | End: 1900-01-01

## 2023-01-21 NOTE — PHYSICAL EXAM
[Right] : right knee [Flexion] : flexion [Extension] : extension [] : negative straight leg raise [FreeTextEntry3] : tender around the knee

## 2023-01-21 NOTE — HISTORY OF PRESENT ILLNESS
[Lower back] : lower back [8] : 8 [1] : 2 [Localized] : localized [Radiating] : radiating [Tingling] : tingling [Intermittent] : intermittent [Household chores] : household chores [Nothing helps with pain getting better] : Nothing helps with pain getting better [Walking] : walking [Lying in bed] : lying in bed [de-identified] : 7/19/19: Ms. Ella Coon, a 83-year-old female, presents today for fu lower back - plan at last was "would\par reinintiaite PT at this point for tx of compression fracture and have f/u in a month or so - if not getting better then\par would order MRI in order to assess the healing" - overall doing about the same - going down the left leg - right leg is\par okay - no loss of bb control - USES CANE - at times uses gabapentin - no PT currently it was helpful for her - using\par brace at times \par xrays today:\par L spine 4 views - DS at L4-5, compression deformity at L4, slight slip AT l5-s1, scoliosis in the lumbar\par AP pelvis -no obvious fx\par ---\par 12/4/20: Patient presents with upper and mid back pain for roughly 2 weeks. No specific injury. Pain started in the mid\par back, with tightness and spasm like pain, and has now extended into the lower back. Notes radicular pain in both legs.\par She has been taking Gabapentin with little relief. She saw Dr. Velasco last week, who prescribed MDP (mild improvement)\par and ordered MRIs.\par MRI T spine: Severe disc space narrowing and severe edematous endplate changes at T10-T11 with mild bone edema.\par No acute fracture. No compression deformity.\par Severe multilevel degenerative disc disease throughout the entire thoracic spine including disc space narrowing and\par vertebral endplate changes most severe at the mid thoracic spine.\par Mildly ectatic ascending aorta noted up to 3.7 cm. Heterogeneous left thyroid gland with suggestion of nodules and a\par right thyroidectomy. Consider thyroid ultrasound.\par Multilevel disc bulges from T1 through T12 impinging the thecal sac. No focal disc herniation. No compression of the\par spinal cord.\par MRI L spine: 1. No acute fracture. Mild chronic loss of height of the superior endplates of L3 and L4 with 20% loss of\par height.\par 2. Moderate to severe multilevel degenerative disc disease.\par 3. Incompletely imaged enlarged either multifibroid or adenomatous uterus. Recommend follow up pelvic ultrasound.\par She is the aware of the thyroid and the fibrid uterus findings\par \par She is the aware of the thyroid and the fibrid uterus findings\par 1/15/21: Here for fu - plan at last was "reviewed the MRI - has what looks like mild acute compression \par and T11 and also DS with stenosis L4-5 and stenosis L5-S1 - we discussed tx options - she isn't intere\par at this point - rec PT - shes tried pain management without relief- Tylenol #3 script" - having pain in th\par at this point and into the tailbone - the Tylenol #3 - the middle back not too bad - no PT so far \par xrays today:\par T spine 2 views - no new fracture \par L spine 2 views - DS at l4-5\par AP pelvis - no obvious fx\par \par 6/5/21: Here for pain in the back and in the right knee - SHE FELL and hit her chin - was about 4 days \par the pain in the right deep posterior buttock and down the right leg \par gabapentin helps \par xrays today:\par t spine - new new fracture \par L spine - no new fracture \par AP pelvis - no obvious fx\par r KNEE - 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 Was seen by PMD who referred her to vascular and being treated with laser. No known PVD. Pain in left leg constant but much worse with walking and standing and better with rest and elevation. Gabapentin qhs with help sleeping. \par No similar pain in past. Back pain intermittently. No BB dysfunction, no foot drop. \par \par Hx of comnpression fractures but this is diff pain. \par also right knee instability at times. \par \par xrays today:\par L spine - ds at L4-5, diffuse spondylosis, loss of disc hieght \par AP PELVIS - hip narrowing \par r KNEE - OA IN THE KNEE\par \par \par 01/21/23 here for a follow up on the lower spine ,still having pain on the left leg /hip numbness.  pain is worse.  no injury.  had vascular surgery since last visit thinking this would help leg, but it did not.  aleve helps a little. [] : no [FreeTextEntry1] : left leg  [de-identified] : nothing

## 2023-01-21 NOTE — DISCUSSION/SUMMARY
[de-identified] : worsening shooting down the legs - indicated for MRi of the L spine \par fu to review the MRi

## 2023-02-03 ENCOUNTER — APPOINTMENT (OUTPATIENT)
Dept: ORTHOPEDIC SURGERY | Facility: CLINIC | Age: 88
End: 2023-02-03
Payer: MEDICARE

## 2023-02-03 VITALS — BODY MASS INDEX: 30.12 KG/M2 | WEIGHT: 170 LBS | HEIGHT: 63 IN

## 2023-02-03 DIAGNOSIS — G89.29 DORSALGIA, UNSPECIFIED: ICD-10-CM

## 2023-02-03 DIAGNOSIS — M54.9 DORSALGIA, UNSPECIFIED: ICD-10-CM

## 2023-02-03 PROCEDURE — 99213 OFFICE O/P EST LOW 20 MIN: CPT

## 2023-02-03 RX ORDER — CYCLOBENZAPRINE HYDROCHLORIDE 5 MG/1
5 TABLET, FILM COATED ORAL
Qty: 20 | Refills: 0 | Status: ACTIVE | COMMUNITY
Start: 2023-02-03 | End: 1900-01-01

## 2023-02-03 NOTE — IMAGING
[de-identified] : PE lumbar spine: +tenderness to L paraspinals, no midline tenderness, limited motion due to pain, able to SLR with pain, motor and sensory intact, NVI\par

## 2023-02-03 NOTE — ASSESSMENT
[FreeTextEntry1] : A/P LBP with radiculopathy with recent MRI\par - medrol dose kishore\par - muscle relaxant\par - f/u Dr. Conner for MRI results\par

## 2023-02-03 NOTE — HISTORY OF PRESENT ILLNESS
[10] : 10 [8] : 8 [de-identified] : 86 y/o F with chronic low back pain x 1 day. Pain down L leg. Occasional numbness/tingling. denies bladder or bowel issues. Has not tried any interventions.\par She has seen Dr. Jesus and received MRI. Pt was given celebrex but didn't want to take after reading side effects.\par denies DM.\par  [FreeTextEntry5] : pt states pain lt lower back going down lt leg few months, pt states no specific injury

## 2023-02-06 ENCOUNTER — APPOINTMENT (OUTPATIENT)
Dept: ORTHOPEDIC SURGERY | Facility: CLINIC | Age: 88
End: 2023-02-06
Payer: MEDICARE

## 2023-02-06 VITALS — BODY MASS INDEX: 30.12 KG/M2 | HEIGHT: 63 IN | WEIGHT: 170 LBS

## 2023-02-06 DIAGNOSIS — M81.0 AGE-RELATED OSTEOPOROSIS W/OUT CURRENT PATHOLOGICAL FRACTURE: ICD-10-CM

## 2023-02-06 PROCEDURE — 99214 OFFICE O/P EST MOD 30 MIN: CPT

## 2023-02-06 NOTE — DISCUSSION/SUMMARY
[de-identified] : reviewed the MRi \par discussion of the case \par sympomatic stenosis with radiculopathy \par trying medication currently \par would be a good candidate for LESi \par

## 2023-02-06 NOTE — HISTORY OF PRESENT ILLNESS
[Lower back] : lower back [7] : 7 [2] : 2 [Localized] : localized [Radiating] : radiating [Tingling] : tingling [Intermittent] : intermittent [Household chores] : household chores [Rest] : rest [Nothing helps with pain getting better] : Nothing helps with pain getting better [Walking] : walking [Lying in bed] : lying in bed [de-identified] : 7/19/19: Ms. Ella Coon, a 83-year-old female, presents today for fu lower back - plan at last was "would\par reinintiaite PT at this point for tx of compression fracture and have f/u in a month or so - if not getting better then\par would order MRI in order to assess the healing" - overall doing about the same - going down the left leg - right leg is\par okay - no loss of bb control - USES CANE - at times uses gabapentin - no PT currently it was helpful for her - using\par brace at times \par xrays today:\par L spine 4 views - DS at L4-5, compression deformity at L4, slight slip AT l5-s1, scoliosis in the lumbar\par AP pelvis -no obvious fx\par ---\par 12/4/20: Patient presents with upper and mid back pain for roughly 2 weeks. No specific injury. Pain started in the mid\par back, with tightness and spasm like pain, and has now extended into the lower back. Notes radicular pain in both legs.\par She has been taking Gabapentin with little relief. She saw Dr. Velasco last week, who prescribed MDP (mild improvement)\par and ordered MRIs.\par MRI T spine: Severe disc space narrowing and severe edematous endplate changes at T10-T11 with mild bone edema.\par No acute fracture. No compression deformity.\par Severe multilevel degenerative disc disease throughout the entire thoracic spine including disc space narrowing and\par vertebral endplate changes most severe at the mid thoracic spine.\par Mildly ectatic ascending aorta noted up to 3.7 cm. Heterogeneous left thyroid gland with suggestion of nodules and a\par right thyroidectomy. Consider thyroid ultrasound.\par Multilevel disc bulges from T1 through T12 impinging the thecal sac. No focal disc herniation. No compression of the\par spinal cord.\par MRI L spine: 1. No acute fracture. Mild chronic loss of height of the superior endplates of L3 and L4 with 20% loss of\par height.\par 2. Moderate to severe multilevel degenerative disc disease.\par 3. Incompletely imaged enlarged either multifibroid or adenomatous uterus. Recommend follow up pelvic ultrasound.\par She is the aware of the thyroid and the fibrid uterus findings\par \par She is the aware of the thyroid and the fibrid uterus findings\par 1/15/21: Here for fu - plan at last was "reviewed the MRI - has what looks like mild acute compression \par and T11 and also DS with stenosis L4-5 and stenosis L5-S1 - we discussed tx options - she isn't intere\par at this point - rec PT - shes tried pain management without relief- Tylenol #3 script" - having pain in th\par at this point and into the tailbone - the Tylenol #3 - the middle back not too bad - no PT so far \par xrays today:\par T spine 2 views - no new fracture \par L spine 2 views - DS at l4-5\par AP pelvis - no obvious fx\par \par 6/5/21: Here for pain in the back and in the right knee - SHE FELL and hit her chin - was about 4 days \par the pain in the right deep posterior buttock and down the right leg \par gabapentin helps \par xrays today:\par t spine - new new fracture \par L spine - no new fracture \par AP pelvis - no obvious fx\par r KNEE - 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 Was seen by PMD who referred her to vascular and being treated with laser. No known PVD. Pain in left leg constant but much worse with walking and standing and better with rest and elevation. Gabapentin qhs with help sleeping. \par No similar pain in past. Back pain intermittently. No BB dysfunction, no foot drop. \par \par Hx of comnpression fractures but this is diff pain. \par also right knee instability at times. \par \par xrays today:\par L spine - ds at L4-5, diffuse spondylosis, loss of disc hieght \par AP PELVIS - hip narrowing \par r KNEE - OA IN THE KNEE\par \par \par 01/21/23 here for a follow up on the lower spine ,still having pain on the left leg /hip numbness.  pain is worse.  no injury.  had vascular surgery since last visit thinking this would help leg, but it did not.  aleve helps a little.\par \par 2/6/23: Here for fu - plan at last was "worsening shooting down the legs - indicated for MRi of the L spine \par fu to review the MRi" - overall doing about the same - was in the office in the urgent care this weekend and was given mdp/flexeril - she didn’t take the celebrex after reading about it due to fear about it - the back is doing a bit better - most of the pain in the left leg at this point \par \par MRi L spine - stable mild chronic compression at L3, chronic mild superior endplate compression deformity at L4 - stable grade anterolisthesis at L4-5 and L5-S1 - progressive spondylosis since 2017 MRI - mild to moderate stenosis  [] : no [FreeTextEntry1] : left leg  [FreeTextEntry5] : Pt is here for MRI review of the lower back. Pain is the same since the last visit and increases with activity.  [FreeTextEntry7] : down the left leg [de-identified] : M [de-identified] : MRI

## 2023-02-06 NOTE — PHYSICAL EXAM
[Flexion] : flexion [Extension] : extension [Right] : right knee [] : negative straight leg raise [FreeTextEntry3] : tender around the knee

## 2023-11-27 ENCOUNTER — APPOINTMENT (OUTPATIENT)
Dept: ORTHOPEDIC SURGERY | Facility: CLINIC | Age: 88
End: 2023-11-27

## 2023-12-15 ENCOUNTER — APPOINTMENT (OUTPATIENT)
Dept: ORTHOPEDIC SURGERY | Facility: CLINIC | Age: 88
End: 2023-12-15
Payer: MEDICARE

## 2023-12-15 PROCEDURE — 99213 OFFICE O/P EST LOW 20 MIN: CPT

## 2023-12-15 RX ORDER — METHYLPREDNISOLONE 4 MG/1
4 TABLET ORAL
Qty: 1 | Refills: 0 | Status: ACTIVE | COMMUNITY
Start: 2023-02-03 | End: 1900-01-01

## 2023-12-15 NOTE — DISCUSSION/SUMMARY
[de-identified] : discussion of the case  sympomatic stenosis with radiculopathy  MDP, gabapentin  fu pain mngmt - consider LESI fu 4 wks

## 2023-12-15 NOTE — HISTORY OF PRESENT ILLNESS
[Lower back] : lower back [Localized] : localized [Radiating] : radiating [Tingling] : tingling [Intermittent] : intermittent [Household chores] : household chores [Rest] : rest [Nothing helps with pain getting better] : Nothing helps with pain getting better [Walking] : walking [Lying in bed] : lying in bed [de-identified] : 7/19/19: Ms. Ella Coon, a 83-year-old female, presents today for fu lower back - plan at last was "would reinintiaite PT at this point for tx of compression fracture and have f/u in a month or so - if not getting better then would order MRI in order to assess the healing" - overall doing about the same - going down the left leg - right leg is okay - no loss of bb control - USES CANE - at times uses gabapentin - no PT currently it was helpful for her - using brace at times  xrays today: L spine 4 views - DS at L4-5, compression deformity at L4, slight slip AT l5-s1, scoliosis in the lumbar AP pelvis -no obvious fx --- 12/4/20: Patient presents with upper and mid back pain for roughly 2 weeks. No specific injury. Pain started in the mid back, with tightness and spasm like pain, and has now extended into the lower back. Notes radicular pain in both legs. She has been taking Gabapentin with little relief. She saw Dr. Velasco last week, who prescribed MDP (mild improvement) and ordered MRIs. MRI T spine: Severe disc space narrowing and severe edematous endplate changes at T10-T11 with mild bone edema. No acute fracture. No compression deformity. Severe multilevel degenerative disc disease throughout the entire thoracic spine including disc space narrowing and vertebral endplate changes most severe at the mid thoracic spine. Mildly ectatic ascending aorta noted up to 3.7 cm. Heterogeneous left thyroid gland with suggestion of nodules and a right thyroidectomy. Consider thyroid ultrasound. Multilevel disc bulges from T1 through T12 impinging the thecal sac. No focal disc herniation. No compression of the spinal cord. MRI L spine: 1. No acute fracture. Mild chronic loss of height of the superior endplates of L3 and L4 with 20% loss of height. 2. Moderate to severe multilevel degenerative disc disease. 3. Incompletely imaged enlarged either multifibroid or adenomatous uterus. Recommend follow up pelvic ultrasound. She is the aware of the thyroid and the fibrid uterus findings  She is the aware of the thyroid and the fibrid uterus findings 1/15/21: Here for fu - plan at last was "reviewed the MRI - has what looks like mild acute compression  and T11 and also DS with stenosis L4-5 and stenosis L5-S1 - we discussed tx options - she isn't intere at this point - rec PT - shes tried pain management without relief- Tylenol #3 script" - having pain in th at this point and into the tailbone - the Tylenol #3 - the middle back not too bad - no PT so far  xrays today: T spine 2 views - no new fracture  L spine 2 views - DS at l4-5 AP pelvis - no obvious fx  6/5/21: Here for pain in the back and in the right knee - SHE FELL and hit her chin - was about 4 days  the pain in the right deep posterior buttock and down the right leg  gabapentin helps  xrays today: t spine - new new fracture  L spine - no new fracture  AP pelvis - no obvious fx r KNEE - 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 Was seen by PMD who referred her to vascular and being treated with laser. No known PVD. Pain in left leg constant but much worse with walking and standing and better with rest and elevation. Gabapentin qhs with help sleeping.  No similar pain in past. Back pain intermittently. No BB dysfunction, no foot drop.   Hx of comnpression fractures but this is diff pain.  also right knee instability at times.   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  pain is worse.  no injury.  had vascular surgery since last visit thinking this would help leg, but it did not.  aleve helps a little.  2/6/23: Here for fu - plan at last was "worsening shooting down the legs - indicated for MRi of the L spine  fu to review the MRi" - overall doing about the same - was in the office in the urgent care this weekend and was given mdp/flexeril - she didn't take the celebrex after reading about it due to fear about it - the back is doing a bit better - most of the pain in the left leg at this point   MRi L spine - stable mild chronic compression at L3, chronic mild superior endplate compression deformity at L4 - stable grade anterolisthesis at L4-5 and L5-S1 - progressive spondylosis since 2017 MRI - mild to moderate stenosis    12/15/23:  here today to follow up on her lower back. pt states symptoms increased. c/o RLE>LLE radiating pain.    [] : no [FreeTextEntry1] : left leg  [FreeTextEntry7] : down the left leg [de-identified] : MRI

## 2023-12-20 NOTE — PATIENT PROFILE ADULT - NSTRANSFERBELONGINGSRESP_GEN_A_NUR
Mohs Case Number: [id number] Date Of Previous Biopsy (Optional): 12.13.23 Previous Accession (Optional): [id number] Biopsy Photograph Reviewed: Yes Referring Physician (Optional): BG Gutierres Consent Type: Consent 1 (Standard) Eye Shield Used: No Initial Size Of Lesion: 0.5 Number Of Stages: 1 Primary Defect Length In Cm (Final Defect Size - Required For Flaps/Grafts): 0.7 Repair Type: Complex Repair Which Eyelid Repair Cpt Are You Using?: 87821 yes Oculoplastic Surgeon Procedure Text (A): After obtaining clear surgical margins the patient was sent to oculoplastics for surgical repair.  The patient understands they will receive post-surgical care and follow-up from the referring physician's office. Otolaryngologist Procedure Text (A): After obtaining clear surgical margins the patient was sent to otolaryngology for surgical repair.  The patient understands they will receive post-surgical care and follow-up from the referring physician's office. Plastic Surgeon Procedure Text (A): After obtaining clear surgical margins the patient was sent to plastics for surgical repair.  The patient understands they will receive post-surgical care and follow-up from the referring physician's office. Mid-Level Procedure Text (A): After obtaining clear surgical margins the patient was sent to a mid-level provider for surgical repair.  The patient understands they will receive post-surgical care and follow-up from the mid-level provider. Provider Procedure Text (A): After obtaining clear surgical margins the defect was repaired by another provider. Asc Procedure Text (A): After obtaining clear surgical margins the patient was sent to an ASC for surgical repair.  The patient understands they will receive post-surgical care and follow-up from the ASC physician. Simple / Intermediate / Complex Repair - Final Wound Length In Cm: 4 Suturegard Retention Suture: 2-0 Nylon Retention Suture Bite Size: 3 mm Length To Time In Minutes Device Was In Place: 10 Width Of Defect Perpendicular To Closure In Cm (Required): 0.8 Undermining Type: Entire Wound Debridement Text: The wound edges were debrided prior to proceeding with the closure to facilitate wound healing. Helical Rim Text: The closure involved the helical rim. Vermilion Border Text: The closure involved the vermilion border. Nostril Rim Text: The closure involved the nostril rim. Retention Suture Text: Retention sutures were placed to support the closure and prevent dehiscence. Secondary Defect Length In Cm (Required For Flaps): 0 Location Indication Override (Is Already Calculated Based On Selected Body Location): Area M Area H Indication Text: Tumors in this location are included in Area H (eyelids, eyebrows, nose, lips, chin, ear, pre-auricular, post-auricular, temple, genitalia, hands, feet, ankles and areola).  Tissue conservation is critical in these anatomic locations. Area M Indication Text: Tumors in this location are included in Area M (cheek, forehead, scalp, neck, jawline and pretibial skin).  Mohs surgery is indicated for tumors in these anatomic locations. Area L Indication Text: Tumors in this location are included in Area L (trunk and extremities).  Mohs surgery is indicated for larger tumors, or tumors with aggressive histologic features, in these anatomic locations. Tumor Debulked?: curette Special Stains Stage 1 - Results: Base On Clearance Noted Above Stage 2: Additional Anesthesia Type: 1% lidocaine with epinephrine Staging Info: By selecting yes to the question above you will include information on AJCC 8 tumor staging in your Mohs note. Information on tumor staging will be automatically added for SCCs on the head and neck. AJCC 8 includes tumor size, tumor depth, perineural involvement and bone invasion. Tumor Depth: Less than 6mm from granular layer and no invasion beyond the subcutaneous fat Was The Patient On Physician Recommended Anticoagulation Therapy?: Please Select the Appropriate Response Medical Necessity Statement: Based on my medical judgement, Mohs surgery is the most appropriate treatment for this cancer compared to other treatments. Alternatives Discussed Intro (Do Not Add Period): I discussed alternative treatments to Mohs surgery and specifically discussed the risks and benefits of Consent 1/Introductory Paragraph: The rationale for Mohs was explained to the patient and consent was obtained. The risks, benefits and alternatives to therapy were discussed in detail. Specifically, the risks of infection, scarring, bleeding, prolonged wound healing, incomplete removal, allergy to anesthesia, nerve injury and recurrence were addressed. Prior to the procedure, the treatment site was clearly identified and confirmed by the patient. All components of Universal Protocol/PAUSE Rule completed. Consent 2/Introductory Paragraph: Mohs surgery was explained to the patient and consent was obtained. The risks, benefits and alternatives to therapy were discussed in detail. Specifically, the risks of infection, scarring, bleeding, prolonged wound healing, incomplete removal, allergy to anesthesia, nerve injury and recurrence were addressed. Prior to the procedure, the treatment site was clearly identified and confirmed by the patient. All components of Universal Protocol/PAUSE Rule completed. Consent 3/Introductory Paragraph: I gave the patient a chance to ask questions they had about the procedure.  Following this I explained the Mohs procedure and consent was obtained. The risks, benefits and alternatives to therapy were discussed in detail. Specifically, the risks of infection, scarring, bleeding, prolonged wound healing, incomplete removal, allergy to anesthesia, nerve injury and recurrence were addressed. Prior to the procedure, the treatment site was clearly identified and confirmed by the patient. All components of Universal Protocol/PAUSE Rule completed. Consent (Temporal Branch)/Introductory Paragraph: The rationale for Mohs was explained to the patient and consent was obtained. The risks, benefits and alternatives to therapy were discussed in detail. Specifically, the risks of damage to the temporal branch of the facial nerve, infection, scarring, bleeding, prolonged wound healing, incomplete removal, allergy to anesthesia, and recurrence were addressed. Prior to the procedure, the treatment site was clearly identified and confirmed by the patient. All components of Universal Protocol/PAUSE Rule completed. Consent (Marginal Mandibular)/Introductory Paragraph: The rationale for Mohs was explained to the patient and consent was obtained. The risks, benefits and alternatives to therapy were discussed in detail. Specifically, the risks of damage to the marginal mandibular branch of the facial nerve, infection, scarring, bleeding, prolonged wound healing, incomplete removal, allergy to anesthesia, and recurrence were addressed. Prior to the procedure, the treatment site was clearly identified and confirmed by the patient. All components of Universal Protocol/PAUSE Rule completed. Consent (Spinal Accessory)/Introductory Paragraph: The rationale for Mohs was explained to the patient and consent was obtained. The risks, benefits and alternatives to therapy were discussed in detail. Specifically, the risks of damage to the spinal accessory nerve, infection, scarring, bleeding, prolonged wound healing, incomplete removal, allergy to anesthesia, and recurrence were addressed. Prior to the procedure, the treatment site was clearly identified and confirmed by the patient. All components of Universal Protocol/PAUSE Rule completed. Consent (Near Eyelid Margin)/Introductory Paragraph: The rationale for Mohs was explained to the patient and consent was obtained. The risks, benefits and alternatives to therapy were discussed in detail. Specifically, the risks of ectropion or eyelid deformity, infection, scarring, bleeding, prolonged wound healing, incomplete removal, allergy to anesthesia, nerve injury and recurrence were addressed. Prior to the procedure, the treatment site was clearly identified and confirmed by the patient. All components of Universal Protocol/PAUSE Rule completed. Consent (Ear)/Introductory Paragraph: The rationale for Mohs was explained to the patient and consent was obtained. The risks, benefits and alternatives to therapy were discussed in detail. Specifically, the risks of ear deformity, infection, scarring, bleeding, prolonged wound healing, incomplete removal, allergy to anesthesia, nerve injury and recurrence were addressed. Prior to the procedure, the treatment site was clearly identified and confirmed by the patient. All components of Universal Protocol/PAUSE Rule completed. Consent (Nose)/Introductory Paragraph: The rationale for Mohs was explained to the patient and consent was obtained. The risks, benefits and alternatives to therapy were discussed in detail. Specifically, the risks of nasal deformity, changes in the flow of air through the nose, infection, scarring, bleeding, prolonged wound healing, incomplete removal, allergy to anesthesia, nerve injury and recurrence were addressed. Prior to the procedure, the treatment site was clearly identified and confirmed by the patient. All components of Universal Protocol/PAUSE Rule completed. Consent (Lip)/Introductory Paragraph: The rationale for Mohs was explained to the patient and consent was obtained. The risks, benefits and alternatives to therapy were discussed in detail. Specifically, the risks of lip deformity, changes in the oral aperture, infection, scarring, bleeding, prolonged wound healing, incomplete removal, allergy to anesthesia, nerve injury and recurrence were addressed. Prior to the procedure, the treatment site was clearly identified and confirmed by the patient. All components of Universal Protocol/PAUSE Rule completed. Consent (Scalp)/Introductory Paragraph: The rationale for Mohs was explained to the patient and consent was obtained. The risks, benefits and alternatives to therapy were discussed in detail. Specifically, the risks of changes in hair growth pattern secondary to repair, infection, scarring, bleeding, prolonged wound healing, incomplete removal, allergy to anesthesia, nerve injury and recurrence were addressed. Prior to the procedure, the treatment site was clearly identified and confirmed by the patient. All components of Universal Protocol/PAUSE Rule completed. Detail Level: Detailed Postop Diagnosis: same Anesthesia Type: 1% lidocaine with epinephrine and a 1:10 solution of 8.4% sodium bicarbonate Anesthesia Volume In Cc: 6 Hemostasis: Electrocautery Estimated Blood Loss (Cc): minimal Repair Anesthesia Method: local infiltration Brow Lift Text: A midfrontal incision was made medially to the defect to allow access to the tissues just superior to the left eyebrow. Following careful dissection inferiorly in a supraperiosteal plane to the level of the left eyebrow, several 3-0 monocryl sutures were used to resuspend the eyebrow orbicularis oculi muscular unit to the superior frontal bone periosteum. This resulted in an appropriate reapproximation of static eyebrow symmetry and correction of the left brow ptosis. Deep Sutures: 5-0 Vicryl Epidermal Sutures: 5-0 Fast Absorbing Gut Epidermal Closure: running Suturegard Intro: Intraoperative tissue expansion was performed, utilizing the SUTUREGARD device, in order to reduce wound tension. Suturegard Body: The suture ends were repeatedly re-tightened and re-clamped to achieve the desired tissue expansion. Hemigard Intro: Due to skin fragility and wound tension, it was decided to use HEMIGARD adhesive retention suture devices to permit a linear closure. The skin was cleaned and dried for a 6cm distance away from the wound. Excessive hair, if present, was removed to allow for adhesion. Hemigard Postcare Instructions: The HEMIGARD strips are to remain completely dry for at least 5-7 days. Donor Site Anesthesia Type: same as repair anesthesia Epidermal Closure Graft Donor Site (Optional): simple interrupted Graft Donor Site Bandage (Optional-Leave Blank If You Don't Want In Note): Steri-strips and a pressure bandage were applied to the donor site. Closure 2 Information: This tab is for additional flaps and grafts, including complex repair and grafts and complex repair and flaps. You can also specify a different location for the additional defect, if the location is the same you do not need to select a new one. We will insert the automated text for the repair you select below just as we do for solitary flaps and grafts. Please note that at this time if you select a location with a different insurance zone you will need to override the ICD10 and CPT if appropriate. Closure 3 Information: This tab is for additional flaps and grafts above and beyond our usual structured repairs.  Please note if you enter information here it will not currently bill and you will need to add the billing information manually. Wound Care: Aquaphor Dressing: dry sterile dressing Wound Care (No Sutures): Petrolatum Suture Removal: 7 days Unna Boot Text: An Unna boot was placed to help immobilize the limb and facilitate more rapid healing. Home Suture Removal Text: Patient was provided instructions on removing sutures and will remove their sutures at home.  If they have any questions or difficulties they will call the office. Post-Care Instructions: I reviewed with the patient in detail post-care instructions. Patient is not to engage in any heavy lifting, exercise, or swimming for the next 14 days. Should the patient develop any fevers, chills, bleeding, severe pain patient will contact the office immediately. Pain Refusal Text: I offered to prescribe pain medication but the patient refused to take this medication. Mauc Instructions: By selecting yes to the question below the MAUC number will be added into the note.  This will be calculated automatically based on the diagnosis chosen, the size entered, the body zone selected (H,M,L) and the specific indications you chose. You will also have the option to override the Mohs AUC if you disagree with the automatically calculated number and this option is found in the Case Summary tab. Where Do You Want The Question To Include Opioid Counseling Located?: Case Summary Tab Eye Protection Verbiage: Before proceeding with the stage, a plastic scleral shield was inserted. The globe was anesthetized with a few drops of 1% lidocaine with 1:100,000 epinephrine. Then, an appropriate sized scleral shield was chosen and coated with lacrilube ointment. The shield was gently inserted and left in place for the duration of each stage. After the stage was completed, the shield was gently removed. Mohs Method Verbiage: An incision at a 45 degree angle following the standard Mohs approach was done and the specimen was harvested as a microscopic controlled layer. Surgeon/Pathologist Verbiage (Will Incorporate Name Of Surgeon From Intro If Not Blank): operated in two distinct and integrated capacities as the surgeon and pathologist. Mohs Histo Method Verbiage: Each section was then chromacoded and processed in the Mohs lab using the Mohs protocol and submitted for frozen section. Subsequent Stages Histo Method Verbiage: Using a similar technique to that described above, a thin layer of tissue was removed from all areas where tumor was visible on the previous stage.  The tissue was again oriented, mapped, dyed, and processed as above. Mohs Rapid Report Verbiage: The area of clinically evident tumor was marked with skin marking ink and appropriately hatched.  The initial incision was made following the Mohs approach through the skin.  The specimen was taken to the lab, divided into the necessary number of pieces, chromacoded and processed according to the Mohs protocol.  This was repeated in successive stages until a tumor free defect was achieved. Complex Repair Preamble Text (Leave Blank If You Do Not Want): Extensive wide undermining was performed. Intermediate Repair Preamble Text (Leave Blank If You Do Not Want): Undermining was performed with blunt dissection. Non-Graft Cartilage Fenestration Text: The cartilage was fenestrated with a 2mm punch biopsy to help facilitate healing. Graft Cartilage Fenestration Text: The cartilage was fenestrated with a 2mm punch biopsy to help facilitate graft survival and healing. Secondary Intention Text (Leave Blank If You Do Not Want): The defect will heal with secondary intention. No Repair - Repaired With Adjacent Surgical Defect Text (Leave Blank If You Do Not Want): After obtaining clear surgical margins the defect was repaired concurrently with another surgical defect which was in close approximation. Adjacent Tissue Transfer Text: The defect edges were debeveled with a #15 scalpel blade.  Given the location of the defect and the proximity to free margins an adjacent tissue transfer was deemed most appropriate.  Using a sterile surgical marker, an appropriate flap was drawn incorporating the defect and placing the expected incisions within the relaxed skin tension lines where possible.    The area thus outlined was incised deep to adipose tissue with a #15 scalpel blade.  The skin margins were undermined to an appropriate distance in all directions utilizing iris scissors. Advancement Flap (Single) Text: The defect edges were debeveled with a #15 scalpel blade.  Given the location of the defect and the proximity to free margins a single advancement flap was deemed most appropriate.  Using a sterile surgical marker, an appropriate advancement flap was drawn incorporating the defect and placing the expected incisions within the relaxed skin tension lines where possible.    The area thus outlined was incised deep to adipose tissue with a #15 scalpel blade.  The skin margins were undermined to an appropriate distance in all directions utilizing iris scissors. Advancement Flap (Double) Text: The defect edges were debeveled with a #15 scalpel blade.  Given the location of the defect and the proximity to free margins a double advancement flap was deemed most appropriate.  Using a sterile surgical marker, the appropriate advancement flaps were drawn incorporating the defect and placing the expected incisions within the relaxed skin tension lines where possible.    The area thus outlined was incised deep to adipose tissue with a #15 scalpel blade.  The skin margins were undermined to an appropriate distance in all directions utilizing iris scissors. Burow's Advancement Flap Text: The defect edges were debeveled with a #15 scalpel blade.  Given the location of the defect and the proximity to free margins a Burow's advancement flap was deemed most appropriate.  Using a sterile surgical marker, the appropriate advancement flap was drawn incorporating the defect and placing the expected incisions within the relaxed skin tension lines where possible.    The area thus outlined was incised deep to adipose tissue with a #15 scalpel blade.  The skin margins were undermined to an appropriate distance in all directions utilizing iris scissors. Chonodrocutaneous Helical Advancement Flap Text: The defect edges were debeveled with a #15 scalpel blade.  Given the location of the defect and the proximity to free margins a chondrocutaneous helical advancement flap was deemed most appropriate.  Using a sterile surgical marker, the appropriate advancement flap was drawn incorporating the defect and placing the expected incisions within the relaxed skin tension lines where possible.    The area thus outlined was incised deep to adipose tissue with a #15 scalpel blade.  The skin margins were undermined to an appropriate distance in all directions utilizing iris scissors. Crescentic Advancement Flap Text: The defect edges were debeveled with a #15 scalpel blade.  Given the location of the defect and the proximity to free margins a crescentic advancement flap was deemed most appropriate.  Using a sterile surgical marker, the appropriate advancement flap was drawn incorporating the defect and placing the expected incisions within the relaxed skin tension lines where possible.    The area thus outlined was incised deep to adipose tissue with a #15 scalpel blade.  The skin margins were undermined to an appropriate distance in all directions utilizing iris scissors. A-T Advancement Flap Text: The defect edges were debeveled with a #15 scalpel blade.  Given the location of the defect, shape of the defect and the proximity to free margins an A-T advancement flap was deemed most appropriate.  Using a sterile surgical marker, an appropriate advancement flap was drawn incorporating the defect and placing the expected incisions within the relaxed skin tension lines where possible.    The area thus outlined was incised deep to adipose tissue with a #15 scalpel blade.  The skin margins were undermined to an appropriate distance in all directions utilizing iris scissors. O-T Advancement Flap Text: The defect edges were debeveled with a #15 scalpel blade.  Given the location of the defect, shape of the defect and the proximity to free margins an O-T advancement flap was deemed most appropriate.  Using a sterile surgical marker, an appropriate advancement flap was drawn incorporating the defect and placing the expected incisions within the relaxed skin tension lines where possible.    The area thus outlined was incised deep to adipose tissue with a #15 scalpel blade.  The skin margins were undermined to an appropriate distance in all directions utilizing iris scissors. O-L Flap Text: The defect edges were debeveled with a #15 scalpel blade.  Given the location of the defect, shape of the defect and the proximity to free margins an O-L flap was deemed most appropriate.  Using a sterile surgical marker, an appropriate advancement flap was drawn incorporating the defect and placing the expected incisions within the relaxed skin tension lines where possible.    The area thus outlined was incised deep to adipose tissue with a #15 scalpel blade.  The skin margins were undermined to an appropriate distance in all directions utilizing iris scissors. O-Z Flap Text: The defect edges were debeveled with a #15 scalpel blade.  Given the location of the defect, shape of the defect and the proximity to free margins an O-Z flap was deemed most appropriate.  Using a sterile surgical marker, an appropriate transposition flap was drawn incorporating the defect and placing the expected incisions within the relaxed skin tension lines where possible. The area thus outlined was incised deep to adipose tissue with a #15 scalpel blade.  The skin margins were undermined to an appropriate distance in all directions utilizing iris scissors. Double O-Z Flap Text: The defect edges were debeveled with a #15 scalpel blade.  Given the location of the defect, shape of the defect and the proximity to free margins a Double O-Z flap was deemed most appropriate.  Using a sterile surgical marker, an appropriate transposition flap was drawn incorporating the defect and placing the expected incisions within the relaxed skin tension lines where possible. The area thus outlined was incised deep to adipose tissue with a #15 scalpel blade.  The skin margins were undermined to an appropriate distance in all directions utilizing iris scissors. V-Y Flap Text: The defect edges were debeveled with a #15 scalpel blade.  Given the location of the defect, shape of the defect and the proximity to free margins a V-Y flap was deemed most appropriate.  Using a sterile surgical marker, an appropriate advancement flap was drawn incorporating the defect and placing the expected incisions within the relaxed skin tension lines where possible.    The area thus outlined was incised deep to adipose tissue with a #15 scalpel blade.  The skin margins were undermined to an appropriate distance in all directions utilizing iris scissors. Advancement-Rotation Flap Text: The defect edges were debeveled with a #15 scalpel blade.  Given the location of the defect, shape of the defect and the proximity to free margins an advancement-rotation flap was deemed most appropriate.  Using a sterile surgical marker, an appropriate flap was drawn incorporating the defect and placing the expected incisions within the relaxed skin tension lines where possible. The area thus outlined was incised deep to adipose tissue with a #15 scalpel blade.  The skin margins were undermined to an appropriate distance in all directions utilizing iris scissors. Mercedes Flap Text: The defect edges were debeveled with a #15 scalpel blade.  Given the location of the defect, shape of the defect and the proximity to free margins a Mercedes flap was deemed most appropriate.  Using a sterile surgical marker, an appropriate advancement flap was drawn incorporating the defect and placing the expected incisions within the relaxed skin tension lines where possible. The area thus outlined was incised deep to adipose tissue with a #15 scalpel blade.  The skin margins were undermined to an appropriate distance in all directions utilizing iris scissors. Modified Advancement Flap Text: The defect edges were debeveled with a #15 scalpel blade.  Given the location of the defect, shape of the defect and the proximity to free margins a modified advancement flap was deemed most appropriate.  Using a sterile surgical marker, an appropriate advancement flap was drawn incorporating the defect and placing the expected incisions within the relaxed skin tension lines where possible.    The area thus outlined was incised deep to adipose tissue with a #15 scalpel blade.  The skin margins were undermined to an appropriate distance in all directions utilizing iris scissors. Mucosal Advancement Flap Text: Given the location of the defect, shape of the defect and the proximity to free margins a mucosal advancement flap was deemed most appropriate. Incisions were made with a 15 blade scalpel in the appropriate fashion along the cutaneous vermilion border and the mucosal lip. The remaining actinically damaged mucosal tissue was excised.  The mucosal advancement flap was then elevated to the gingival sulcus with care taken to preserve the neurovascular structures and advanced into the primary defect. Care was taken to ensure that precise realignment of the vermilion border was achieved. Peng Advancement Flap Text: The defect edges were debeveled with a #15 scalpel blade.  Given the location of the defect, shape of the defect and the proximity to free margins a Peng advancement flap was deemed most appropriate.  Using a sterile surgical marker, an appropriate advancement flap was drawn incorporating the defect and placing the expected incisions within the relaxed skin tension lines where possible. The area thus outlined was incised deep to adipose tissue with a #15 scalpel blade.  The skin margins were undermined to an appropriate distance in all directions utilizing iris scissors. Hatchet Flap Text: The defect edges were debeveled with a #15 scalpel blade.  Given the location of the defect, shape of the defect and the proximity to free margins a hatchet flap was deemed most appropriate.  Using a sterile surgical marker, an appropriate hatchet flap was drawn incorporating the defect and placing the expected incisions within the relaxed skin tension lines where possible.    The area thus outlined was incised deep to adipose tissue with a #15 scalpel blade.  The skin margins were undermined to an appropriate distance in all directions utilizing iris scissors. Rotation Flap Text: The defect edges were debeveled with a #15 scalpel blade.  Given the location of the defect, shape of the defect and the proximity to free margins a rotation flap was deemed most appropriate.  Using a sterile surgical marker, an appropriate rotation flap was drawn incorporating the defect and placing the expected incisions within the relaxed skin tension lines where possible.    The area thus outlined was incised deep to adipose tissue with a #15 scalpel blade.  The skin margins were undermined to an appropriate distance in all directions utilizing iris scissors. Bilateral Rotation Flap Text: The defect edges were debeveled with a #15 scalpel blade. Given the location of the defect, shape of the defect and the proximity to free margins a bilateral rotation flap was deemed most appropriate. Using a sterile surgical marker, an appropriate rotation flap was drawn incorporating the defect and placing the expected incisions within the relaxed skin tension lines where possible. The area thus outlined was incised deep to adipose tissue with a #15 scalpel blade. The skin margins were undermined to an appropriate distance in all directions utilizing iris scissors. Following this, the designed flap was carried over into the primary defect and sutured into place. Spiral Flap Text: The defect edges were debeveled with a #15 scalpel blade.  Given the location of the defect, shape of the defect and the proximity to free margins a spiral flap was deemed most appropriate.  Using a sterile surgical marker, an appropriate rotation flap was drawn incorporating the defect and placing the expected incisions within the relaxed skin tension lines where possible. The area thus outlined was incised deep to adipose tissue with a #15 scalpel blade.  The skin margins were undermined to an appropriate distance in all directions utilizing iris scissors. Staged Advancement Flap Text: The defect edges were debeveled with a #15 scalpel blade.  Given the location of the defect, shape of the defect and the proximity to free margins a staged advancement flap was deemed most appropriate.  Using a sterile surgical marker, an appropriate advancement flap was drawn incorporating the defect and placing the expected incisions within the relaxed skin tension lines where possible. The area thus outlined was incised deep to adipose tissue with a #15 scalpel blade.  The skin margins were undermined to an appropriate distance in all directions utilizing iris scissors. Star Wedge Flap Text: The defect edges were debeveled with a #15 scalpel blade.  Given the location of the defect, shape of the defect and the proximity to free margins a star wedge flap was deemed most appropriate.  Using a sterile surgical marker, an appropriate rotation flap was drawn incorporating the defect and placing the expected incisions within the relaxed skin tension lines where possible. The area thus outlined was incised deep to adipose tissue with a #15 scalpel blade.  The skin margins were undermined to an appropriate distance in all directions utilizing iris scissors. Transposition Flap Text: The defect edges were debeveled with a #15 scalpel blade.  Given the location of the defect and the proximity to free margins a transposition flap was deemed most appropriate.  Using a sterile surgical marker, an appropriate transposition flap was drawn incorporating the defect.    The area thus outlined was incised deep to adipose tissue with a #15 scalpel blade.  The skin margins were undermined to an appropriate distance in all directions utilizing iris scissors. Muscle Hinge Flap Text: The defect edges were debeveled with a #15 scalpel blade.  Given the size, depth and location of the defect and the proximity to free margins a muscle hinge flap was deemed most appropriate.  Using a sterile surgical marker, an appropriate hinge flap was drawn incorporating the defect. The area thus outlined was incised with a #15 scalpel blade.  The skin margins were undermined to an appropriate distance in all directions utilizing iris scissors. Mustarde Flap Text: The defect edges were debeveled with a #15 scalpel blade.  Given the size, depth and location of the defect and the proximity to free margins a Mustarde flap was deemed most appropriate.  Using a sterile surgical marker, an appropriate flap was drawn incorporating the defect. The area thus outlined was incised with a #15 scalpel blade.  The skin margins were undermined to an appropriate distance in all directions utilizing iris scissors. Nasal Turnover Hinge Flap Text: The defect edges were debeveled with a #15 scalpel blade.  Given the size, depth, location of the defect and the defect being full thickness a nasal turnover hinge flap was deemed most appropriate.  Using a sterile surgical marker, an appropriate hinge flap was drawn incorporating the defect. The area thus outlined was incised with a #15 scalpel blade. The flap was designed to recreate the nasal mucosal lining and the alar rim. The skin margins were undermined to an appropriate distance in all directions utilizing iris scissors. Nasalis-Muscle-Based Myocutaneous Island Pedicle Flap Text: Using a #15 blade, an incision was made around the donor flap to the level of the nasalis muscle. Wide lateral undermining was then performed in both the subcutaneous plane above the nasalis muscle, and in a submuscular plane just above periosteum. This allowed the formation of a free nasalis muscle axial pedicle (based on the angular artery) which was still attached to the actual cutaneous flap, increasing its mobility and vascular viability. Hemostasis was obtained with pinpoint electrocoagulation. The flap was mobilized into position and the pivotal anchor points positioned and stabilized with buried interrupted sutures. Subcutaneous and dermal tissues were closed in a multilayered fashion with sutures. Tissue redundancies were excised, and the epidermal edges were apposed without significant tension and sutured with sutures. Orbicularis Oris Muscle Flap Text: The defect edges were debeveled with a #15 scalpel blade.  Given that the defect affected the competency of the oral sphincter an orbicularis oris muscle flap was deemed most appropriate to restore this competency and normal muscle function.  Using a sterile surgical marker, an appropriate flap was drawn incorporating the defect. The area thus outlined was incised with a #15 scalpel blade. Melolabial Transposition Flap Text: The defect edges were debeveled with a #15 scalpel blade.  Given the location of the defect and the proximity to free margins a melolabial flap was deemed most appropriate.  Using a sterile surgical marker, an appropriate melolabial transposition flap was drawn incorporating the defect.    The area thus outlined was incised deep to adipose tissue with a #15 scalpel blade.  The skin margins were undermined to an appropriate distance in all directions utilizing iris scissors. Rhombic Flap Text: The defect edges were debeveled with a #15 scalpel blade.  Given the location of the defect and the proximity to free margins a rhombic flap was deemed most appropriate.  Using a sterile surgical marker, an appropriate rhombic flap was drawn incorporating the defect.    The area thus outlined was incised deep to adipose tissue with a #15 scalpel blade.  The skin margins were undermined to an appropriate distance in all directions utilizing iris scissors. Rhomboid Transposition Flap Text: The defect edges were debeveled with a #15 scalpel blade.  Given the location of the defect and the proximity to free margins a rhomboid transposition flap was deemed most appropriate.  Using a sterile surgical marker, an appropriate rhomboid flap was drawn incorporating the defect.    The area thus outlined was incised deep to adipose tissue with a #15 scalpel blade.  The skin margins were undermined to an appropriate distance in all directions utilizing iris scissors. Bi-Rhombic Flap Text: The defect edges were debeveled with a #15 scalpel blade.  Given the location of the defect and the proximity to free margins a bi-rhombic flap was deemed most appropriate.  Using a sterile surgical marker, an appropriate rhombic flap was drawn incorporating the defect. The area thus outlined was incised deep to adipose tissue with a #15 scalpel blade.  The skin margins were undermined to an appropriate distance in all directions utilizing iris scissors. Helical Rim Advancement Flap Text: The defect edges were debeveled with a #15 blade scalpel.  Given the location of the defect and the proximity to free margins (helical rim) a double helical rim advancement flap was deemed most appropriate.  Using a sterile surgical marker, the appropriate advancement flaps were drawn incorporating the defect and placing the expected incisions between the helical rim and antihelix where possible.  The area thus outlined was incised through and through with a #15 scalpel blade.  With a skin hook and iris scissors, the flaps were gently and sharply undermined and freed up. Bilateral Helical Rim Advancement Flap Text: The defect edges were debeveled with a #15 blade scalpel.  Given the location of the defect and the proximity to free margins (helical rim) a bilateral helical rim advancement flap was deemed most appropriate.  Using a sterile surgical marker, the appropriate advancement flaps were drawn incorporating the defect and placing the expected incisions between the helical rim and antihelix where possible.  The area thus outlined was incised through and through with a #15 scalpel blade.  With a skin hook and iris scissors, the flaps were gently and sharply undermined and freed up. Ear Star Wedge Flap Text: The defect edges were debeveled with a #15 blade scalpel.  Given the location of the defect and the proximity to free margins (helical rim) an ear star wedge flap was deemed most appropriate.  Using a sterile surgical marker, the appropriate flap was drawn incorporating the defect and placing the expected incisions between the helical rim and antihelix where possible.  The area thus outlined was incised through and through with a #15 scalpel blade. Banner Transposition Flap Text: The defect edges were debeveled with a #15 scalpel blade.  Given the location of the defect and the proximity to free margins a Banner transposition flap was deemed most appropriate.  Using a sterile surgical marker, an appropriate flap drawn around the defect. The area thus outlined was incised deep to adipose tissue with a #15 scalpel blade.  The skin margins were undermined to an appropriate distance in all directions utilizing iris scissors. Bilobed Flap Text: The defect edges were debeveled with a #15 scalpel blade.  Given the location of the defect and the proximity to free margins a bilobe flap was deemed most appropriate.  Using a sterile surgical marker, an appropriate bilobe flap drawn around the defect.    The area thus outlined was incised deep to adipose tissue with a #15 scalpel blade.  The skin margins were undermined to an appropriate distance in all directions utilizing iris scissors. Bilobed Transposition Flap Text: The defect edges were debeveled with a #15 scalpel blade.  Given the location of the defect and the proximity to free margins a bilobed transposition flap was deemed most appropriate.  Using a sterile surgical marker, an appropriate bilobe flap drawn around the defect.    The area thus outlined was incised deep to adipose tissue with a #15 scalpel blade.  The skin margins were undermined to an appropriate distance in all directions utilizing iris scissors. Trilobed Flap Text: The defect edges were debeveled with a #15 scalpel blade.  Given the location of the defect and the proximity to free margins a trilobed flap was deemed most appropriate.  Using a sterile surgical marker, an appropriate trilobed flap drawn around the defect.    The area thus outlined was incised deep to adipose tissue with a #15 scalpel blade.  The skin margins were undermined to an appropriate distance in all directions utilizing iris scissors. Dorsal Nasal Flap Text: The defect edges were debeveled with a #15 scalpel blade.  Given the location of the defect and the proximity to free margins a dorsal nasal flap was deemed most appropriate.  Using a sterile surgical marker, an appropriate dorsal nasal flap was drawn around the defect.    The area thus outlined was incised deep to adipose tissue with a #15 scalpel blade.  The skin margins were undermined to an appropriate distance in all directions utilizing iris scissors. Island Pedicle Flap Text: The defect edges were debeveled with a #15 scalpel blade.  Given the location of the defect, shape of the defect and the proximity to free margins an island pedicle advancement flap was deemed most appropriate.  Using a sterile surgical marker, an appropriate advancement flap was drawn incorporating the defect, outlining the appropriate donor tissue and placing the expected incisions within the relaxed skin tension lines where possible.    The area thus outlined was incised deep to adipose tissue with a #15 scalpel blade.  The skin margins were undermined to an appropriate distance in all directions around the primary defect and laterally outward around the island pedicle utilizing iris scissors.  There was minimal undermining beneath the pedicle flap. Island Pedicle Flap With Canthal Suspension Text: The defect edges were debeveled with a #15 scalpel blade.  Given the location of the defect, shape of the defect and the proximity to free margins an island pedicle advancement flap was deemed most appropriate.  Using a sterile surgical marker, an appropriate advancement flap was drawn incorporating the defect, outlining the appropriate donor tissue and placing the expected incisions within the relaxed skin tension lines where possible. The area thus outlined was incised deep to adipose tissue with a #15 scalpel blade.  The skin margins were undermined to an appropriate distance in all directions around the primary defect and laterally outward around the island pedicle utilizing iris scissors.  There was minimal undermining beneath the pedicle flap. A suspension suture was placed in the canthal tendon to prevent tension and prevent ectropion. Alar Island Pedicle Flap Text: The defect edges were debeveled with a #15 scalpel blade.  Given the location of the defect, shape of the defect and the proximity to the alar rim an island pedicle advancement flap was deemed most appropriate.  Using a sterile surgical marker, an appropriate advancement flap was drawn incorporating the defect, outlining the appropriate donor tissue and placing the expected incisions within the nasal ala running parallel to the alar rim. The area thus outlined was incised with a #15 scalpel blade.  The skin margins were undermined minimally to an appropriate distance in all directions around the primary defect and laterally outward around the island pedicle utilizing iris scissors.  There was minimal undermining beneath the pedicle flap. Double Island Pedicle Flap Text: The defect edges were debeveled with a #15 scalpel blade.  Given the location of the defect, shape of the defect and the proximity to free margins a double island pedicle advancement flap was deemed most appropriate.  Using a sterile surgical marker, an appropriate advancement flap was drawn incorporating the defect, outlining the appropriate donor tissue and placing the expected incisions within the relaxed skin tension lines where possible.    The area thus outlined was incised deep to adipose tissue with a #15 scalpel blade.  The skin margins were undermined to an appropriate distance in all directions around the primary defect and laterally outward around the island pedicle utilizing iris scissors.  There was minimal undermining beneath the pedicle flap. Island Pedicle Flap-Requiring Vessel Identification Text: The defect edges were debeveled with a #15 scalpel blade.  Given the location of the defect, shape of the defect and the proximity to free margins an island pedicle advancement flap was deemed most appropriate.  Using a sterile surgical marker, an appropriate advancement flap was drawn, based on the axial vessel mentioned above, incorporating the defect, outlining the appropriate donor tissue and placing the expected incisions within the relaxed skin tension lines where possible.    The area thus outlined was incised deep to adipose tissue with a #15 scalpel blade.  The skin margins were undermined to an appropriate distance in all directions around the primary defect and laterally outward around the island pedicle utilizing iris scissors.  There was minimal undermining beneath the pedicle flap. Keystone Flap Text: The defect edges were debeveled with a #15 scalpel blade.  Given the location of the defect, shape of the defect a keystone flap was deemed most appropriate.  Using a sterile surgical marker, an appropriate keystone flap was drawn incorporating the defect, outlining the appropriate donor tissue and placing the expected incisions within the relaxed skin tension lines where possible. The area thus outlined was incised deep to adipose tissue with a #15 scalpel blade.  The skin margins were undermined to an appropriate distance in all directions around the primary defect and laterally outward around the flap utilizing iris scissors. O-T Plasty Text: The defect edges were debeveled with a #15 scalpel blade.  Given the location of the defect, shape of the defect and the proximity to free margins an O-T plasty was deemed most appropriate.  Using a sterile surgical marker, an appropriate O-T plasty was drawn incorporating the defect and placing the expected incisions within the relaxed skin tension lines where possible.    The area thus outlined was incised deep to adipose tissue with a #15 scalpel blade.  The skin margins were undermined to an appropriate distance in all directions utilizing iris scissors. O-Z Plasty Text: The defect edges were debeveled with a #15 scalpel blade.  Given the location of the defect, shape of the defect and the proximity to free margins an O-Z plasty (double transposition flap) was deemed most appropriate.  Using a sterile surgical marker, the appropriate transposition flaps were drawn incorporating the defect and placing the expected incisions within the relaxed skin tension lines where possible.    The area thus outlined was incised deep to adipose tissue with a #15 scalpel blade.  The skin margins were undermined to an appropriate distance in all directions utilizing iris scissors.  Hemostasis was achieved with electrocautery.  The flaps were then transposed into place, one clockwise and the other counterclockwise, and anchored with interrupted buried subcutaneous sutures. Double O-Z Plasty Text: The defect edges were debeveled with a #15 scalpel blade.  Given the location of the defect, shape of the defect and the proximity to free margins a Double O-Z plasty (double transposition flap) was deemed most appropriate.  Using a sterile surgical marker, the appropriate transposition flaps were drawn incorporating the defect and placing the expected incisions within the relaxed skin tension lines where possible. The area thus outlined was incised deep to adipose tissue with a #15 scalpel blade.  The skin margins were undermined to an appropriate distance in all directions utilizing iris scissors.  Hemostasis was achieved with electrocautery.  The flaps were then transposed into place, one clockwise and the other counterclockwise, and anchored with interrupted buried subcutaneous sutures. V-Y Plasty Text: The defect edges were debeveled with a #15 scalpel blade.  Given the location of the defect, shape of the defect and the proximity to free margins an V-Y advancement flap was deemed most appropriate.  Using a sterile surgical marker, an appropriate advancement flap was drawn incorporating the defect and placing the expected incisions within the relaxed skin tension lines where possible.    The area thus outlined was incised deep to adipose tissue with a #15 scalpel blade.  The skin margins were undermined to an appropriate distance in all directions utilizing iris scissors. H Plasty Text: Given the location of the defect, shape of the defect and the proximity to free margins a H-plasty was deemed most appropriate for repair.  Using a sterile surgical marker, the appropriate advancement arms of the H-plasty were drawn incorporating the defect and placing the expected incisions within the relaxed skin tension lines where possible. The area thus outlined was incised deep to adipose tissue with a #15 scalpel blade. The skin margins were undermined to an appropriate distance in all directions utilizing iris scissors.  The opposing advancement arms were then advanced into place in opposite direction and anchored with interrupted buried subcutaneous sutures. W Plasty Text: The lesion was extirpated to the level of the fat with a #15 scalpel blade.  Given the location of the defect, shape of the defect and the proximity to free margins a W-plasty was deemed most appropriate for repair.  Using a sterile surgical marker, the appropriate transposition arms of the W-plasty were drawn incorporating the defect and placing the expected incisions within the relaxed skin tension lines where possible.    The area thus outlined was incised deep to adipose tissue with a #15 scalpel blade.  The skin margins were undermined to an appropriate distance in all directions utilizing iris scissors.  The opposing transposition arms were then transposed into place in opposite direction and anchored with interrupted buried subcutaneous sutures. Z Plasty Text: The lesion was extirpated to the level of the fat with a #15 scalpel blade.  Given the location of the defect, shape of the defect and the proximity to free margins a Z-plasty was deemed most appropriate for repair.  Using a sterile surgical marker, the appropriate transposition arms of the Z-plasty were drawn incorporating the defect and placing the expected incisions within the relaxed skin tension lines where possible.    The area thus outlined was incised deep to adipose tissue with a #15 scalpel blade.  The skin margins were undermined to an appropriate distance in all directions utilizing iris scissors.  The opposing transposition arms were then transposed into place in opposite direction and anchored with interrupted buried subcutaneous sutures. Double Z Plasty Text: The lesion was extirpated to the level of the fat with a #15 scalpel blade. Given the location of the defect, shape of the defect and the proximity to free margins a double Z-plasty was deemed most appropriate for repair. Using a sterile surgical marker, the appropriate transposition arms of the double Z-plasty were drawn incorporating the defect and placing the expected incisions within the relaxed skin tension lines where possible. The area thus outlined was incised deep to adipose tissue with a #15 scalpel blade. The skin margins were undermined to an appropriate distance in all directions utilizing iris scissors. The opposing transposition arms were then transposed and carried over into place in opposite direction and anchored with interrupted buried subcutaneous sutures. Zygomaticofacial Flap Text: Given the location of the defect, shape of the defect and the proximity to free margins a zygomaticofacial flap was deemed most appropriate for repair.  Using a sterile surgical marker, the appropriate flap was drawn incorporating the defect and placing the expected incisions within the relaxed skin tension lines where possible. The area thus outlined was incised deep to adipose tissue with a #15 scalpel blade with preservation of a vascular pedicle.  The skin margins were undermined to an appropriate distance in all directions utilizing iris scissors.  The flap was then placed into the defect and anchored with interrupted buried subcutaneous sutures. Cheek Interpolation Flap Text: A decision was made to reconstruct the defect utilizing an interpolation axial flap and a staged reconstruction.  A telfa template was made of the defect.  This telfa template was then used to outline the Cheek Interpolation flap.  The donor area for the pedicle flap was then injected with anesthesia.  The flap was excised through the skin and subcutaneous tissue down to the layer of the underlying musculature.  The interpolation flap was carefully excised within this deep plane to maintain its blood supply.  The edges of the donor site were undermined.   The donor site was closed in a primary fashion.  The pedicle was then rotated into position and sutured.  Once the tube was sutured into place, adequate blood supply was confirmed with blanching and refill.  The pedicle was then wrapped with xeroform gauze and dressed appropriately with a telfa and gauze bandage to ensure continued blood supply and protect the attached pedicle. Cheek-To-Nose Interpolation Flap Text: A decision was made to reconstruct the defect utilizing an interpolation axial flap and a staged reconstruction.  A telfa template was made of the defect.  This telfa template was then used to outline the Cheek-To-Nose Interpolation flap.  The donor area for the pedicle flap was then injected with anesthesia.  The flap was excised through the skin and subcutaneous tissue down to the layer of the underlying musculature.  The interpolation flap was carefully excised within this deep plane to maintain its blood supply.  The edges of the donor site were undermined.   The donor site was closed in a primary fashion.  The pedicle was then rotated into position and sutured.  Once the tube was sutured into place, adequate blood supply was confirmed with blanching and refill.  The pedicle was then wrapped with xeroform gauze and dressed appropriately with a telfa and gauze bandage to ensure continued blood supply and protect the attached pedicle. Interpolation Flap Text: A decision was made to reconstruct the defect utilizing an interpolation axial flap and a staged reconstruction.  A telfa template was made of the defect.  This telfa template was then used to outline the interpolation flap.  The donor area for the pedicle flap was then injected with anesthesia.  The flap was excised through the skin and subcutaneous tissue down to the layer of the underlying musculature.  The interpolation flap was carefully excised within this deep plane to maintain its blood supply.  The edges of the donor site were undermined.   The donor site was closed in a primary fashion.  The pedicle was then rotated into position and sutured.  Once the tube was sutured into place, adequate blood supply was confirmed with blanching and refill.  The pedicle was then wrapped with xeroform gauze and dressed appropriately with a telfa and gauze bandage to ensure continued blood supply and protect the attached pedicle. Melolabial Interpolation Flap Text: A decision was made to reconstruct the defect utilizing an interpolation axial flap and a staged reconstruction.  A telfa template was made of the defect.  This telfa template was then used to outline the melolabial interpolation flap.  The donor area for the pedicle flap was then injected with anesthesia.  The flap was excised through the skin and subcutaneous tissue down to the layer of the underlying musculature.  The pedicle flap was carefully excised within this deep plane to maintain its blood supply.  The edges of the donor site were undermined.   The donor site was closed in a primary fashion.  The pedicle was then rotated into position and sutured.  Once the tube was sutured into place, adequate blood supply was confirmed with blanching and refill.  The pedicle was then wrapped with xeroform gauze and dressed appropriately with a telfa and gauze bandage to ensure continued blood supply and protect the attached pedicle. Mastoid Interpolation Flap Text: A decision was made to reconstruct the defect utilizing an interpolation axial flap and a staged reconstruction.  A telfa template was made of the defect.  This telfa template was then used to outline the mastoid interpolation flap.  The donor area for the pedicle flap was then injected with anesthesia.  The flap was excised through the skin and subcutaneous tissue down to the layer of the underlying musculature.  The pedicle flap was carefully excised within this deep plane to maintain its blood supply.  The edges of the donor site were undermined.   The donor site was closed in a primary fashion.  The pedicle was then rotated into position and sutured.  Once the tube was sutured into place, adequate blood supply was confirmed with blanching and refill.  The pedicle was then wrapped with xeroform gauze and dressed appropriately with a telfa and gauze bandage to ensure continued blood supply and protect the attached pedicle. Posterior Auricular Interpolation Flap Text: A decision was made to reconstruct the defect utilizing an interpolation axial flap and a staged reconstruction.  A telfa template was made of the defect.  This telfa template was then used to outline the posterior auricular interpolation flap.  The donor area for the pedicle flap was then injected with anesthesia.  The flap was excised through the skin and subcutaneous tissue down to the layer of the underlying musculature.  The pedicle flap was carefully excised within this deep plane to maintain its blood supply.  The edges of the donor site were undermined.   The donor site was closed in a primary fashion.  The pedicle was then rotated into position and sutured.  Once the tube was sutured into place, adequate blood supply was confirmed with blanching and refill.  The pedicle was then wrapped with xeroform gauze and dressed appropriately with a telfa and gauze bandage to ensure continued blood supply and protect the attached pedicle. Paramedian Forehead Flap Text: A decision was made to reconstruct the defect utilizing an interpolation axial flap and a staged reconstruction.  A telfa template was made of the defect.  This telfa template was then used to outline the paramedian forehead pedicle flap.  The donor area for the pedicle flap was then injected with anesthesia.  The flap was excised through the skin and subcutaneous tissue down to the layer of the underlying musculature.  The pedicle flap was carefully excised within this deep plane to maintain its blood supply.  The edges of the donor site were undermined.   The donor site was closed in a primary fashion.  The pedicle was then rotated into position and sutured.  Once the tube was sutured into place, adequate blood supply was confirmed with blanching and refill.  The pedicle was then wrapped with xeroform gauze and dressed appropriately with a telfa and gauze bandage to ensure continued blood supply and protect the attached pedicle. Abbe Flap (Upper To Lower Lip) Text: The defect of the lower lip was assessed and measured.  Given the location and size of the defect, an Abbe flap was deemed most appropriate.  Using a sterile surgical marker, an appropriate Abbe flap was measured and drawn on the upper lip. Local anesthesia was then infiltrated.  A scalpel was then used to incise the upper lip through and through the skin, vermilion, muscle and mucosa, leaving the flap pedicled on the opposite side.  The flap was then rotated and transferred to the lower lip defect.  The flap was then sutured into place with a three layer technique, closing the orbicularis oris muscle layer with subcutaneous buried sutures, followed by a mucosal layer and an epidermal layer. Abbe Flap (Lower To Upper Lip) Text: The defect of the upper lip was assessed and measured.  Given the location and size of the defect, an Abbe flap was deemed most appropriate.  Using a sterile surgical marker, an appropriate Abbe flap was measured and drawn on the lower lip. Local anesthesia was then infiltrated. A scalpel was then used to incise the upper lip through and through the skin, vermilion, muscle and mucosa, leaving the flap pedicled on the opposite side.  The flap was then rotated and transferred to the lower lip defect.  The flap was then sutured into place with a three layer technique, closing the orbicularis oris muscle layer with subcutaneous buried sutures, followed by a mucosal layer and an epidermal layer. Estlander Flap (Upper To Lower Lip) Text: The defect of the lower lip was assessed and measured.  Given the location and size of the defect, an Estlander flap was deemed most appropriate.  Using a sterile surgical marker, an appropriate Estlander flap was measured and drawn on the upper lip. Local anesthesia was then infiltrated. A scalpel was then used to incise the lateral aspect of the flap, through skin, muscle and mucosa, leaving the flap pedicled medially.  The flap was then rotated and positioned to fill the lower lip defect.  The flap was then sutured into place with a three layer technique, closing the orbicularis oris muscle layer with subcutaneous buried sutures, followed by a mucosal layer and an epidermal layer. Cheiloplasty (Less Than 50%) Text: A decision was made to reconstruct the defect with a  cheiloplasty.  The defect was undermined extensively.  Additional orbicularis oris muscle was excised with a 15 blade scalpel.  The defect was converted into a full thickness wedge, of less than 50% of the vertical height of the lip, to facilite a better cosmetic result.  Small vessels were then tied off with 5-0 monocyrl. The orbicularis oris, superficial fascia, adipose and dermis were then reapproximated.  After the deeper layers were approximated the epidermis was reapproximated with particular care given to realign the vermilion border. Cheiloplasty (Complex) Text: A decision was made to reconstruct the defect with a  cheiloplasty.  The defect was undermined extensively.  Additional orbicularis oris muscle was excised with a 15 blade scalpel.  The defect was converted into a full thickness wedge to facilite a better cosmetic result.  Small vessels were then tied off with 5-0 monocyrl. The orbicularis oris, superficial fascia, adipose and dermis were then reapproximated.  After the deeper layers were approximated the epidermis was reapproximated with particular care given to realign the vermilion border. Ear Wedge Repair Text: A wedge excision was completed by carrying down an excision through the full thickness of the ear and cartilage with an inward facing Burow's triangle. The wound was then closed in a layered fashion. Full Thickness Lip Wedge Repair (Flap) Text: Given the location of the defect and the proximity to free margins a full thickness wedge repair was deemed most appropriate.  Using a sterile surgical marker, the appropriate repair was drawn incorporating the defect and placing the expected incisions perpendicular to the vermilion border.  The vermilion border was also meticulously outlined to ensure appropriate reapproximation during the repair.  The area thus outlined was incised through and through with a #15 scalpel blade.  The muscularis and dermis were reaproximated with deep sutures following hemostasis. Care was taken to realign the vermilion border before proceeding with the superficial closure.  Once the vermilion was realigned the superfical and mucosal closure was finished. Ftsg Text: The defect edges were debeveled with a #15 scalpel blade.  Given the location of the defect, shape of the defect and the proximity to free margins a full thickness skin graft was deemed most appropriate.  Using a sterile surgical marker, the primary defect shape was transferred to the donor site. The area thus outlined was incised deep to adipose tissue with a #15 scalpel blade.  The harvested graft was then trimmed of adipose tissue until only dermis and epidermis was left.  The skin margins of the secondary defect were undermined to an appropriate distance in all directions utilizing iris scissors.  The secondary defect was closed with interrupted buried subcutaneous sutures.  The skin edges were then re-apposed with running  sutures.  The skin graft was then placed in the primary defect and oriented appropriately. Split-Thickness Skin Graft Text: The defect edges were debeveled with a #15 scalpel blade.  Given the location of the defect, shape of the defect and the proximity to free margins a split thickness skin graft was deemed most appropriate.  Using a sterile surgical marker, the primary defect shape was transferred to the donor site. The split thickness graft was then harvested.  The skin graft was then placed in the primary defect and oriented appropriately. Pinch Graft Text: The defect edges were debeveled with a #15 scalpel blade. Given the location of the defect, shape of the defect and the proximity to free margins a pinch graft was deemed most appropriate. Using a sterile surgical marker, the primary defect shape was transferred to the donor site. The area thus outlined was incised deep to adipose tissue with a #15 scalpel blade.  The harvested graft was then trimmed of adipose tissue until only dermis and epidermis was left. The skin margins of the secondary defect were undermined to an appropriate distance in all directions utilizing iris scissors.  The secondary defect was closed with interrupted buried subcutaneous sutures.  The skin edges were then re-apposed with running  sutures.  The skin graft was then placed in the primary defect and oriented appropriately. Burow's Graft Text: The defect edges were debeveled with a #15 scalpel blade.  Given the location of the defect, shape of the defect, the proximity to free margins and the presence of a standing cone deformity a Burow's skin graft was deemed most appropriate. The standing cone was removed and this tissue was then trimmed to the shape of the primary defect. The adipose tissue was also removed until only dermis and epidermis were left.  The skin margins of the secondary defect were undermined to an appropriate distance in all directions utilizing iris scissors.  The secondary defect was closed with interrupted buried subcutaneous sutures.  The skin edges were then re-apposed with running  sutures.  The skin graft was then placed in the primary defect and oriented appropriately. Cartilage Graft Text: The defect edges were debeveled with a #15 scalpel blade.  Given the location of the defect, shape of the defect, the fact the defect involved a full thickness cartilage defect a cartilage graft was deemed most appropriate.  An appropriate donor site was identified, cleansed, and anesthetized. The cartilage graft was then harvested and transferred to the recipient site, oriented appropriately and then sutured into place.  The secondary defect was then repaired using a primary closure. Composite Graft Text: The defect edges were debeveled with a #15 scalpel blade.  Given the location of the defect, shape of the defect, the proximity to free margins and the fact the defect was full thickness a composite graft was deemed most appropriate.  The defect was outline and then transferred to the donor site.  A full thickness graft was then excised from the donor site. The graft was then placed in the primary defect, oriented appropriately and then sutured into place.  The secondary defect was then repaired using a primary closure. Epidermal Autograft Text: The defect edges were debeveled with a #15 scalpel blade.  Given the location of the defect, shape of the defect and the proximity to free margins an epidermal autograft was deemed most appropriate.  Using a sterile surgical marker, the primary defect shape was transferred to the donor site. The epidermal graft was then harvested.  The skin graft was then placed in the primary defect and oriented appropriately. Dermal Autograft Text: The defect edges were debeveled with a #15 scalpel blade.  Given the location of the defect, shape of the defect and the proximity to free margins a dermal autograft was deemed most appropriate.  Using a sterile surgical marker, the primary defect shape was transferred to the donor site. The area thus outlined was incised deep to adipose tissue with a #15 scalpel blade.  The harvested graft was then trimmed of adipose and epidermal tissue until only dermis was left.  The skin graft was then placed in the primary defect and oriented appropriately. Skin Substitute Text: The defect edges were debeveled with a #15 scalpel blade.  Given the location of the defect, shape of the defect and the proximity to free margins a skin substitute graft was deemed most appropriate.  The graft material was trimmed to fit the size of the defect. The graft was then placed in the primary defect and oriented appropriately. Tissue Cultured Epidermal Autograft Text: The defect edges were debeveled with a #15 scalpel blade.  Given the location of the defect, shape of the defect and the proximity to free margins a tissue cultured epidermal autograft was deemed most appropriate.  The graft was then trimmed to fit the size of the defect.  The graft was then placed in the primary defect and oriented appropriately. Xenograft Text: The defect edges were debeveled with a #15 scalpel blade.  Given the location of the defect, shape of the defect and the proximity to free margins a xenograft was deemed most appropriate.  The graft was then trimmed to fit the size of the defect.  The graft was then placed in the primary defect and oriented appropriately. Purse String (Simple) Text: Given the location of the defect and the characteristics of the surrounding skin a purse string closure was deemed most appropriate.  Undermining was performed circumferentially around the surgical defect.  A purse string suture was then placed and tightened. Purse String (Intermediate) Text: Given the location of the defect and the characteristics of the surrounding skin a purse string intermediate closure was deemed most appropriate.  Undermining was performed circumferentially around the surgical defect.  A purse string suture was then placed and tightened. Partial Purse String (Simple) Text: Given the location of the defect and the characteristics of the surrounding skin a simple purse string closure was deemed most appropriate.  Undermining was performed circumferentially around the surgical defect.  A purse string suture was then placed and tightened. Wound tension only allowed a partial closure of the circular defect. Partial Purse String (Intermediate) Text: Given the location of the defect and the characteristics of the surrounding skin an intermediate purse string closure was deemed most appropriate.  Undermining was performed circumferentially around the surgical defect.  A purse string suture was then placed and tightened. Wound tension only allowed a partial closure of the circular defect. Localized Dermabrasion With Wire Brush Text: The patient was draped in routine manner.  Localized dermabrasion using 3 x 17 mm wire brush was performed in routine manner to papillary dermis. This spot dermabrasion is being performed to complete skin cancer reconstruction. It also will eliminate the other sun damaged precancerous cells that are known to be part of the regional effect of a lifetime's worth of sun exposure. This localized dermabrasion is therapeutic and should not be considered cosmetic in any regard. Tarsorrhaphy Text: A tarsorrhaphy was performed using Frost sutures. Intermediate Repair And Flap Additional Text (Will Appearing After The Standard Complex Repair Text): The intermediate repair was not sufficient to completely close the primary defect. The remaining additional defect was repaired with the flap mentioned below. Intermediate Repair And Graft Additional Text (Will Appearing After The Standard Complex Repair Text): The intermediate repair was not sufficient to completely close the primary defect. The remaining additional defect was repaired with the graft mentioned below. Complex Repair And Flap Additional Text (Will Appearing After The Standard Complex Repair Text): The complex repair was not sufficient to completely close the primary defect. The remaining additional defect was repaired with the flap mentioned below. Complex Repair And Graft Additional Text (Will Appearing After The Standard Complex Repair Text): The complex repair was not sufficient to completely close the primary defect. The remaining additional defect was repaired with the graft mentioned below. Eyelid Full Thickness Repair - 36246: The eyelid defect was full thickness which required a wedge repair of the eyelid. Special care was taken to ensure that the eyelid margin was realligned when placing sutures. Manual Repair Warning Statement: We plan on removing the manually selected variable below in favor of our much easier automatic structured text blocks found in the previous tab. We decided to do this to help make the flow better and give you the full power of structured data. Manual selection is never going to be ideal in our platform and I would encourage you to avoid using manual selection from this point on, especially since I will be sunsetting this feature. It is important that you do one of two things with the customized text below. First, you can save all of the text in a word file so you can have it for future reference. Second, transfer the text to the appropriate area in the Library tab. Lastly, if there is a flap or graft type which we do not have you need to let us know right away so I can add it in before the variable is hidden. No need to panic, we plan to give you roughly 6 months to make the change. Same Histology In Subsequent Stages Text: The pattern and morphology of the tumor is as described in the first stage. No Residual Tumor Seen Histology Text: There were no malignant cells seen in the sections examined. Inflammation Suggestive Of Cancer Camouflage Histology Text: There was a dense lymphocytic infiltrate which prevented adequate histologic evaluation of adjacent structures. Area Suspicious For Tumor Histology Text: Area suspicious for tumor. Follicular Atypia Histology Text: Follicular atypia noted. Incidental Superficial Basal Cell Carcinoma Histology Text: Incidentally noted area atypical keratinocytes present throughout the entire thickness of the epidermis. Incidental Squamous Cell Carcinoma In Situ Histology Text: Atypical keratinocytes noted in the basal layer of the epidermis. Bcc Histology Text: There were numerous aggregates of basaloid cells. Bcc Adenoid Histology Text: There were numerous aggregates of basaloid cells demonstrating an adenoid pattern. Bcc Infiltrative Histology Text: There were numerous aggregates of basaloid cells demonstrating an infiltrative pattern. Bcc Infundibulocystic Histology Text: There were numerous aggregates of basaloid cells demonstrating an infundibulocystic pattern. Bcc Keratotic Histology Text: There were numerous aggregates of basaloid cells with squamous differentiation. Bcc Micronodular Histology Text: There were numerous aggregates of basaloid cells demonstrating a micronodular pattern. Bcc  Morpheaform/Sclerosing Histology Text: There were numerous aggregates of thin strands of basaloid cells in a fibrotic stroma. Bcc  Nodular Histology Text: There were numerous aggregates of basaloid cells demonstrating a nodular pattern. Bcc  Nodulocystic Histology Text: There were numerous aggregates of basaloid cells demonstrating a nodular pattern with cystic spaces. Bcc Pigmented Histology Text: There were numerous aggregates of basaloid cells, some with prominent melanin. Bcc Superficial Histology Text: There were numerous small buds of basaloid cells extending from the epidermis. Bcc Superficial Pigmented Histology Text: There were numerous small buds of pigmented basaloid cells extending from the epidermis. Mixed Superficial And Nodular Bcc Histology Text: There were numerous aggregates of basaloid cells demonstrating a superificial and nodular pattern. Mixed Nodular And Infiltrative Bcc Histology Text: There were numerous aggregates of basaloid cells demonstrating a nodular and infiltrative pattern. Mixed Nodular And Micronodular Bcc Histology Text: There were numerous aggregates of basaloid cells demonstrating a nodular and micronodular pattern. Metatypical Bcc Histology Text: There were numerous aggregates of basaloid cells, some demonstrating squamous differentiation. Fibroepithelioma Of Pinkus Histology Text: Net-like pattern of thin anastamosing basaloid cells in loose stroma. Scc Histology Text: Atypical keratinocytes with abundant pink cytoplasm extending into the dermis. Scc Well Differentiated Histology Text: Well differentiated atypical keratinocytes extending into the dermis. Scc Moderately Differentiated Histology Text: Moderately differentiated atypical keratinocytes extending into the dermis. Scc Poorly Differentiated Histology Text: Poorly differentiated atypical keratinocytes extending into the dermis. Scc Acantholytic Histology Text: Atypical keratinocytes extending into the dermis, demonstrating an acantholytic pattern. Scc Pigmented Histology Text: Atypical keratinocytes extending into the dermis, some of which are pigmented. Scc Desmoplastic Subtype Histology Text: Atypical keratinocytes extending into the dermis. Scc Spindle Histology Text: Atypical spindled cells extending into the dermis. Scc In Situ Histology Text: Atypical keratinocytes present throughout the entire thickness of the epidermis. Scc In Situ With Follicular Extension Histology Text: Atypical keratinocytes are present throughout the entire thickness of the epidermis with follicular extension. Afx Histology Text: Dermal cellular proliferation of pleomorphic, bizarre, spindled, epitheliod cells with many atypical mitoses. Basosquamous Cell Carcinoma Histology Text: Aggregates of atypical basaloid cells in the dermis with areas of squamous differentiation. Dfsp Histology Text: Cellular proliferation of spindled fibroblasts demonstrating a storiform pattern in some areas. Desmoplastic Trichoepithelioma Histology Text: Basaloid tumor islands in a reticulated pattern with peripheral palasading of nuclei in a fibrotic stroma. Trichoepithelioma Histology Text: Basaloid tumor islands in a reticulated pattern with peripheral palasading of nuclei and loose stroma with many fibroblasts. Mart-1 - Positive Histology Text: MART-1 staining demonstrates areas of higher density and clustering of melanocytes with Pagetoid spread upwards within the epidermis. The surgical margins are positive for tumor cells. Mart-1 - Negative Histology Text: MART-1 staining demonstrates a normal density and pattern of melanocytes along the dermal-epidermal junction. The surgical margins are negative for tumor cells. Information: Selecting Yes will display possible errors in your note based on the variables you have selected. This validation is only offered as a suggestion for you. PLEASE NOTE THAT THE VALIDATION TEXT WILL BE REMOVED WHEN YOU FINALIZE YOUR NOTE. IF YOU WANT TO FAX A PRELIMINARY NOTE YOU WILL NEED TO TOGGLE THIS TO 'NO' IF YOU DO NOT WANT IT IN YOUR FAXED NOTE.

## 2023-12-29 ENCOUNTER — APPOINTMENT (OUTPATIENT)
Dept: PAIN MANAGEMENT | Facility: CLINIC | Age: 88
End: 2023-12-29
Payer: MEDICARE

## 2023-12-29 VITALS — HEIGHT: 63 IN | WEIGHT: 170 LBS | BODY MASS INDEX: 30.12 KG/M2

## 2023-12-29 PROCEDURE — 99204 OFFICE O/P NEW MOD 45 MIN: CPT

## 2023-12-29 NOTE — HISTORY OF PRESENT ILLNESS
[Lower back] : lower back [10] : 10 [8] : 8 [Dull/Aching] : dull/aching [Stabbing] : stabbing [Constant] : constant [Household chores] : household chores [Leisure] : leisure [Sleep] : sleep [Nothing helps with pain getting better] : Nothing helps with pain getting better [Sitting] : sitting [Walking] : walking [FreeTextEntry1] : Initial HPI 12/29/2023: Pain started Nov 2023 and is on the BILATERAL lower back and radiates into the bilateral buttocks and down the bilateral posterior thighs and lower legs to the toes described as an electrical pain. Left leg is much worse than the right. Saw Dr. Conner who recommended possible CHAUNCEY.   MRI Lumbar Spine 1/31/23 independently reviewed: L4-5, L5-S1 anterolisthesis, moderate central stenosis and L>R mod/severe NF stenosis with L4, L5 impingement.  Conservative Care: has Rx for PT  Pain Medications: Aleve PRB, Gabapentin 300mg QHS; completed MDP with temporary relief  Past Injections: ESIs many years ago  Spine surgery: none  Blood thinners: none [] : This patient has had an injection before: no [FreeTextEntry7] : hips, legs [de-identified] : L MRI

## 2023-12-29 NOTE — DISCUSSION/SUMMARY
[de-identified] : After discussing various treatment options with the patient including but not limited to oral medications, physical therapy, exercise modalities as well as interventional spinal injections, we have decided with the following plan:  - Continue Home exercises, stretching, activity modification, physical therapy, and conservative care. - MRI report and/or images was reviewed and discussed with the patient. - Recommend L5-S1 Lumbar Epidural Steroid Injection under fluoroscopic guidance with image. - The risks, benefits and alternatives of the proposed procedure were explained in detail with the patient. The risks outlined include but are not limited to infection, bleeding, post-dural puncture headache, nerve injury, a temporary increase in pain, failure to resolve symptoms, allergic reaction, symptom recurrence, and possible elevation of blood sugar in diabetics. All questions were answered to patient's apparent satisfaction and he/she verbalized an understanding. - Patient is presenting with acute/sub-acute radicular pain with impairment in ADLs and functionality.  The pain has not responded to conservative care including NSAID therapy and/or physical therapy.  There is no bleeding tendency, unstable medical condition, or systemic infection. - Follow up in 1-2 weeks post injection for re-evaluation.

## 2023-12-29 NOTE — PHYSICAL EXAM
[de-identified] : Constitutional; Appears well, no apparent distress Ability to communicate: Normal  Respiratory: non-labored breathing Skin: No rash noted Head: Normocephalic, atraumatic Neck: no visible thyroid enlargement Eyes: Extraocular movements intact Neurologic: Alert and oriented x3 Psychiatric: normal mood, affect and behavior [] : light touch intact throughout both lower extremities

## 2024-01-15 ENCOUNTER — APPOINTMENT (OUTPATIENT)
Dept: ORTHOPEDIC SURGERY | Facility: CLINIC | Age: 89
End: 2024-01-15
Payer: MEDICARE

## 2024-01-15 VITALS — WEIGHT: 170 LBS | BODY MASS INDEX: 30.12 KG/M2 | HEIGHT: 63 IN

## 2024-01-15 DIAGNOSIS — M43.9 DEFORMING DORSOPATHY, UNSPECIFIED: ICD-10-CM

## 2024-01-15 DIAGNOSIS — M43.16 SPONDYLOLISTHESIS, LUMBAR REGION: ICD-10-CM

## 2024-01-15 PROCEDURE — 99214 OFFICE O/P EST MOD 30 MIN: CPT

## 2024-01-15 NOTE — DISCUSSION/SUMMARY
[Medication Risks Reviewed] : Medication risks reviewed [de-identified] : discussion of the case  sympomatic stenosis with radiculopathy  continue gabapentin  Continue to follow up with Pain management-LESI scheduled for 1/17/24 fu 6 weeks prn

## 2024-01-15 NOTE — HISTORY OF PRESENT ILLNESS
[Lower back] : lower back [8] : 8 [6] : 6 [Dull/Aching] : dull/aching [Localized] : localized [Radiating] : radiating [Tightness] : tightness [] : yes [de-identified] : 7/19/19: Ms. Ella Coon, a 83-year-old female, presents today for fu lower back - plan at last was "would reinintiaite PT at this point for tx of compression fracture and have f/u in a month or so - if not getting better then would order MRI in order to assess the healing" - overall doing about the same - going down the left leg - right leg is okay - no loss of bb control - USES CANE - at times uses gabapentin - no PT currently it was helpful for her - using brace at times  xrays today: L spine 4 views - DS at L4-5, compression deformity at L4, slight slip AT l5-s1, scoliosis in the lumbar AP pelvis -no obvious fx --- 12/4/20: Patient presents with upper and mid back pain for roughly 2 weeks. No specific injury. Pain started in the mid back, with tightness and spasm like pain, and has now extended into the lower back. Notes radicular pain in both legs. She has been taking Gabapentin with little relief. She saw Dr. Velasco last week, who prescribed MDP (mild improvement) and ordered MRIs. MRI T spine: Severe disc space narrowing and severe edematous endplate changes at T10-T11 with mild bone edema. No acute fracture. No compression deformity. Severe multilevel degenerative disc disease throughout the entire thoracic spine including disc space narrowing and vertebral endplate changes most severe at the mid thoracic spine. Mildly ectatic ascending aorta noted up to 3.7 cm. Heterogeneous left thyroid gland with suggestion of nodules and a right thyroidectomy. Consider thyroid ultrasound. Multilevel disc bulges from T1 through T12 impinging the thecal sac. No focal disc herniation. No compression of the spinal cord. MRI L spine: 1. No acute fracture. Mild chronic loss of height of the superior endplates of L3 and L4 with 20% loss of height. 2. Moderate to severe multilevel degenerative disc disease. 3. Incompletely imaged enlarged either multifibroid or adenomatous uterus. Recommend follow up pelvic ultrasound. She is the aware of the thyroid and the fibrid uterus findings  She is the aware of the thyroid and the fibrid uterus findings 1/15/21: Here for fu - plan at last was "reviewed the MRI - has what looks like mild acute compression  and T11 and also DS with stenosis L4-5 and stenosis L5-S1 - we discussed tx options - she isn't intere at this point - rec PT - shes tried pain management without relief- Tylenol #3 script" - having pain in th at this point and into the tailbone - the Tylenol #3 - the middle back not too bad - no PT so far  xrays today: T spine 2 views - no new fracture  L spine 2 views - DS at l4-5 AP pelvis - no obvious fx  6/5/21: Here for pain in the back and in the right knee - SHE FELL and hit her chin - was about 4 days  the pain in the right deep posterior buttock and down the right leg  gabapentin helps  xrays today: t spine - new new fracture  L spine - no new fracture  AP pelvis - no obvious fx r KNEE - 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 Was seen by PMD who referred her to vascular and being treated with laser. No known PVD. Pain in left leg constant but much worse with walking and standing and better with rest and elevation. Gabapentin qhs with help sleeping.  No similar pain in past. Back pain intermittently. No BB dysfunction, no foot drop.   Hx of comnpression fractures but this is diff pain.  also right knee instability at times.   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  pain is worse.  no injury.  had vascular surgery since last visit thinking this would help leg, but it did not.  aleve helps a little.  2/6/23: Here for fu - plan at last was "worsening shooting down the legs - indicated for MRi of the L spine  fu to review the MRi" - overall doing about the same - was in the office in the urgent care this weekend and was given mdp/flexeril - she didn't take the celebrex after reading about it due to fear about it - the back is doing a bit better - most of the pain in the left leg at this point   MRi L spine - stable mild chronic compression at L3, chronic mild superior endplate compression deformity at L4 - stable grade anterolisthesis at L4-5 and L5-S1 - progressive spondylosis since 2017 MRI - mild to moderate stenosis    12/15/23:  here today to follow up on her lower back. pt states symptoms increased. c/o RLE>LLE radiating pain.   1/15/24: here for fu, reports persistent low back pain, with LLE>RLE radicular pain and hamstring tightness. Patient evaluated by Dr. Ernst (pain mgtm) who recommended LESI L5-S1, which is scheduled for 1/17/24. continues to utlize cane for ambulation.  Overall pain remains    [FreeTextEntry7] : down the left leg

## 2024-01-17 ENCOUNTER — APPOINTMENT (OUTPATIENT)
Age: 89
End: 2024-01-17
Payer: MEDICARE

## 2024-01-17 PROCEDURE — 62323 NJX INTERLAMINAR LMBR/SAC: CPT

## 2024-01-21 ENCOUNTER — EMERGENCY (EMERGENCY)
Facility: HOSPITAL | Age: 89
LOS: 0 days | Discharge: HOME | End: 2024-01-21
Attending: STUDENT IN AN ORGANIZED HEALTH CARE EDUCATION/TRAINING PROGRAM
Payer: MEDICARE

## 2024-01-21 VITALS
SYSTOLIC BLOOD PRESSURE: 156 MMHG | HEART RATE: 73 BPM | OXYGEN SATURATION: 96 % | HEIGHT: 64 IN | RESPIRATION RATE: 16 BRPM | TEMPERATURE: 98 F | DIASTOLIC BLOOD PRESSURE: 85 MMHG | WEIGHT: 175.05 LBS

## 2024-01-21 VITALS
TEMPERATURE: 98 F | OXYGEN SATURATION: 98 % | SYSTOLIC BLOOD PRESSURE: 133 MMHG | HEART RATE: 55 BPM | RESPIRATION RATE: 18 BRPM | DIASTOLIC BLOOD PRESSURE: 75 MMHG

## 2024-01-21 DIAGNOSIS — R20.2 PARESTHESIA OF SKIN: ICD-10-CM

## 2024-01-21 DIAGNOSIS — M79.89 OTHER SPECIFIED SOFT TISSUE DISORDERS: ICD-10-CM

## 2024-01-21 DIAGNOSIS — Z98.89 OTHER SPECIFIED POSTPROCEDURAL STATES: Chronic | ICD-10-CM

## 2024-01-21 DIAGNOSIS — M71.22 SYNOVIAL CYST OF POPLITEAL SPACE [BAKER], LEFT KNEE: ICD-10-CM

## 2024-01-21 DIAGNOSIS — Z85.038 PERSONAL HISTORY OF OTHER MALIGNANT NEOPLASM OF LARGE INTESTINE: ICD-10-CM

## 2024-01-21 DIAGNOSIS — E78.5 HYPERLIPIDEMIA, UNSPECIFIED: ICD-10-CM

## 2024-01-21 DIAGNOSIS — M48.00 SPINAL STENOSIS, SITE UNSPECIFIED: ICD-10-CM

## 2024-01-21 DIAGNOSIS — M79.662 PAIN IN LEFT LOWER LEG: ICD-10-CM

## 2024-01-21 DIAGNOSIS — I10 ESSENTIAL (PRIMARY) HYPERTENSION: ICD-10-CM

## 2024-01-21 PROCEDURE — 93970 EXTREMITY STUDY: CPT | Mod: 26

## 2024-01-21 PROCEDURE — 99284 EMERGENCY DEPT VISIT MOD MDM: CPT

## 2024-01-21 NOTE — ED PROVIDER NOTE - CONSTITUTIONAL, MLM
normal... Well appearing, awake, alert, oriented to person, place, time/situation and in no apparent distress. Well appearing, awake, alert, oriented to person, place, time/situation and in no apparent distress, pt refused medication for pain

## 2024-01-21 NOTE — ED ADULT NURSE NOTE - NSFALLRISKINTERV_ED_ALL_ED
Assistance OOB with selected safe patient handling equipment if applicable/Assistance with ambulation/Communicate fall risk and risk factors to all staff, patient, and family/Monitor gait and stability/Provide visual cue: yellow wristband, yellow gown, etc/Reinforce activity limits and safety measures with patient and family/Call bell, personal items and telephone in reach/Instruct patient to call for assistance before getting out of bed/chair/stretcher/Non-slip footwear applied when patient is off stretcher/[place] to call system/Physically safe environment - no spills, clutter or unnecessary equipment/Purposeful Proactive Rounding/Room/bathroom lighting operational, light cord in reach

## 2024-01-21 NOTE — ED ADULT NURSE NOTE - OBJECTIVE STATEMENT
Pt presents to ED c/o LLE pain that started a couple hours ago. Pt denies trauma. Pt denies numbing, SOB or chest pain at this time. PMH goiter & colon CA. Safety maintained.

## 2024-01-21 NOTE — ED ADULT TRIAGE NOTE - WHEN WAS YOUR LAST VACCINATION? MONTH
March Enbrel Counseling:  I discussed with the patient the risks of etanercept including but not limited to myelosuppression, immunosuppression, autoimmune hepatitis, demyelinating diseases, lymphoma, and infections.  The patient understands that monitoring is required including a PPD at baseline and must alert us or the primary physician if symptoms of infection or other concerning signs are noted.

## 2024-01-21 NOTE — ED ADULT TRIAGE NOTE - CHIEF COMPLAINT QUOTE
hx of Goiter, Colan CA, HLD, PW nontraumatic LLE x " couple hrs" denies AC, chest pain and SOB reports

## 2024-01-21 NOTE — ED PROVIDER NOTE - OBJECTIVE STATEMENT
88 year old female with h/o HTN, spinal stenosis, colon cancer, goiter and HLD presents today c/o left lower extremity pain and swelling which started suddenly at 11pm while watching television at home, pt states that her legs were elevated, describes a pain rated 8/10 at the time to her shin area associated with tingling, pt noticed swelling to her left shin area and noticed a dilated vessel which she has not had before, pt reports having an epidural two days ago for her chronic back pain due to stenosis, pt did recently travel to [place] by bus which was a 6 hour drive, returned last Sunday, she denies chest pain, sob, palpitations, on exam pt is well appearing, appearing in no distress and able to speak in complete and clear sentences, left leg is not swollen, no calf pain, skin normal, no erythema, + pedal pulses (-) straight leg raise test, DDX includes but not limited to dvt, lumbar radiculopathy. dependent edema, varicose veins, will order sono, medicate for pain as needed, reassess and dispo

## 2024-01-21 NOTE — ED ADULT NURSE REASSESSMENT NOTE - NS ED NURSE REASSESS COMMENT FT1
Patient received on stretcher, awake and alert c/o b/l leg pain L worse then R for 2 months, worsening since Saturday. No swelling or deformity noted. Patient denies SOB. Awaiting LE ultrasound. Patient updated on POC.

## 2024-01-21 NOTE — ED PROVIDER NOTE - PATIENT PORTAL LINK FT
You can access the FollowMyHealth Patient Portal offered by [facility] by registering at the following website: [email or web address]. By joining [facility]’s FollowMyHealth portal, you will also be able to view your health information using other applications (apps) compatible with our system.

## 2024-02-02 ENCOUNTER — APPOINTMENT (OUTPATIENT)
Dept: PAIN MANAGEMENT | Facility: CLINIC | Age: 89
End: 2024-02-02
Payer: MEDICARE

## 2024-02-02 VITALS — HEIGHT: 63 IN | BODY MASS INDEX: 30.12 KG/M2 | WEIGHT: 170 LBS

## 2024-02-02 DIAGNOSIS — M54.16 RADICULOPATHY, LUMBAR REGION: ICD-10-CM

## 2024-02-02 DIAGNOSIS — M48.07 SPINAL STENOSIS, LUMBOSACRAL REGION: ICD-10-CM

## 2024-02-02 PROCEDURE — 99214 OFFICE O/P EST MOD 30 MIN: CPT

## 2024-02-02 NOTE — PHYSICAL EXAM
[de-identified] : Constitutional; Appears well, no apparent distress Ability to communicate: Normal  Respiratory: non-labored breathing Skin: No rash noted Head: Normocephalic, atraumatic Neck: no visible thyroid enlargement Eyes: Extraocular movements intact Neurologic: Alert and oriented x3 Psychiatric: normal mood, affect and behavior [] : no lumbar paraspinal tenderness

## 2024-02-02 NOTE — DISCUSSION/SUMMARY
[de-identified] : After discussing various treatment options with the patient including but not limited to oral medications, physical therapy, exercise modalities as well as interventional spinal injections, we have decided with the following plan:  - Continue Home exercises, stretching, activity modification, physical therapy, and conservative care. - MRI report and/or images was reviewed and discussed with the patient. - Recommend Caudal Epidural Steroid Injection under fluoroscopic guidance with image. - The risks, benefits and alternatives of the proposed procedure were explained in detail with the patient. The risks outlined include but are not limited to infection, bleeding, post-dural puncture headache, nerve injury, a temporary increase in pain, failure to resolve symptoms, allergic reaction, symptom recurrence, and possible elevation of blood sugar in diabetics. All questions were answered to patient's apparent satisfaction and he/she verbalized an understanding. - Patient is presenting with acute/sub-acute radicular pain with impairment in ADLs and functionality.  The pain has not responded to conservative care including NSAID therapy and/or physical therapy.  There is no bleeding tendency, unstable medical condition, or systemic infection. - Follow up in 1-2 weeks post injection for re-evaluation. - Will provide prescription for Physical Therapy.

## 2024-02-02 NOTE — HISTORY OF PRESENT ILLNESS
After Visit Summary   9/26/2017    Quiana Dunaway    MRN: [id number]           Patient Information     Date Of Birth          1934        Visit Information        Provider Department      9/26/2017 3:00 PM Jacqueline Webb APRN CNP [facility] [place]        Today's Diagnoses     Episodic atrial flutter (H)    -  1       Follow-ups after your visit        Your next 10 appointments already scheduled     Oct 02, 2017  2:00 PM CDT   Level 2 with SH INFUSION CHAIR 17   [facility] ([facility])    [place] [address]  [place] [address]   [phone]            Oct 09, 2017  8:30 AM CDT   Level 2 with SH INFUSION CHAIR 6   [facility] ([facility])    [facility]  [address]  [place] [address]   [phone]            Oct 09, 2017  8:30 AM CDT   Return Visit with Alex Parry MD   [facility] ([facility])    [facility]  [address]  [place] [address]   [phone]            Oct 16, 2017  1:00 PM CDT   Level 2 with SH INFUSION CHAIR 8   [facility] ([facility])    [facility]  [address]  [place] [address]   [phone]            Oct 23, 2017  8:00 AM CDT   Level 2 with SH INFUSION CHAIR 6   [facility] ([facility])    [facility]  [address]  [place] [address]   [phone]            Oct 30, 2017  4:30 PM CDT   Courtesy Device Check with RIOS DCR2   [facility] [place] ([facility])    [address]  [place] [address]   [phone]            Dec 06, 2017  2:00 PM CST   Remote PPM Check  "with RIOS TECH1   [facility] [place] ([facility])    [address] [place], [place] [id number]  [place] [address] [phone]           This appointment is for a remote check of your pacemaker.  This is not an appointment at the office.              Who to contact     If you have questions or need follow up information about today's clinic visit or your schedule please contact [facility] [place] directly at [phone].  Normal or non-critical lab and imaging results will be communicated to you by [employer], letter or phone within 4 business days after the clinic has received the results. If you do not hear from us within 7 days, please contact the clinic through [employer] In[employer] or phone. If you have a critical or abnormal lab result, we will notify you by phone as soon as possible.  Submit refill requests through [employer] or call your pharmacy and they will forward the refill request to us. Please allow 3 business days for your refill to be completed.          Additional Information About Your Visit        [facility] Information     [employer] lets you send messages to your doctor, view your test results, renew your prescriptions, schedule appointments and more. To sign up, go to [email or web address] . Click on \"Log in\" on the left side of the screen, which will take you to the Welcome page. Then click on \"Sign up Now\" on the right side of the page.     You will be asked to enter the access code listed below, as well as some personal information. Please follow the directions to create your username and password.     Your access code is: [email or web address]  Expires: 2017  3:33 PM     Your access code will  in 90 days. If you need help or a new code, please call your [facility] clinic or [phone].        Care EveryWhere ID     This is your Care EveryWhere ID. This could be used by other organizations to access your [facility] medical " [Lower back] : lower back "records  [id number]        Your Vitals Were     Pulse Height BMI (Body Mass Index)             84 1.575 m (5' 2\") 30.73 kg/m2          Blood Pressure from Last 3 Encounters:   09/26/17 112/60   09/25/17 140/69   09/18/17 141/80    Weight from Last 3 Encounters:   09/26/17 76.2 kg (168 lb)   09/25/17 75.8 kg (167 lb)   09/18/17 76.6 kg (168 lb 12.8 oz)              We Performed the Following     Follow-Up with Electrophysiologist        Primary Care Provider Office Phone # Fax #    César Chung [phone]       [place] [address] [address]        Equal Access to Services     [place] campusTHERESA : Hadii aad ku hadasho Soomaali, waaxda luqadaha, qaybta kaalmada adeegyada, waxbrenton gaines haydiana rodriguez . So [facility] [phone].    ATENCIÓN: Si habla español, tiene a contreras disposición servicios gratuitos de asistencia lingüística. Chad al [phone].    We comply with applicable federal civil rights laws and [place] laws. We do not discriminate on the basis of race, color, national origin, age, disability sex, sexual orientation or gender identity.            Thank you!     Thank you for choosing [facility]  for your care. Our goal is always to provide you with excellent care. Hearing back from our patients is one way we can continue to improve our services. Please take a few minutes to complete the written survey that you may receive in the mail after your visit with us. Thank you!             Your Updated Medication List - Protect others around you: Learn how to safely use, store and throw away your medicines at www.disposemymeds.org.          This list is accurate as of: 9/26/17 11:59 PM.  Always use your most recent med list.                   Brand Name Dispense Instructions for use Diagnosis    acyclovir 400 MG tablet    ZOVIRAX    60 tablet    Take 1 tablet (400 mg) by mouth 2 times daily Viral Prophylaxis.    " [10] : 10 Multiple myeloma not having achieved remission (H)       aspirin 81 MG chewable tablet      Take 81 mg by mouth        calcium carbonate-vitamin D 600-400 MG-UNIT Chew     180 tablet    Take 1 chew tab by mouth 2 times daily    Multiple myeloma not having achieved remission (H)       dexamethasone 4 MG tablet    DECADRON    30 tablet    Take 10 tablets (40 mg) by mouth every 7 days for 3 doses Days 1, 8, and 15.    Multiple myeloma not having achieved remission (H)       escitalopram 5 MG tablet    LEXAPRO    90 tablet    TAKE 1 TABLET BY MOUTH DAILY    ROBER (generalized anxiety disorder)       LENalidomide 10 MG Caps capsule CHEMO    REVLIMID    14 capsule    Take 1 capsule (10 mg) by mouth daily for 14 days Days 1 through 14.    Multiple myeloma not having achieved remission (H)       lisinopril 10 MG tablet    PRINIVIL/ZESTRIL    90 tablet    Take 1 tablet (10 mg) by mouth daily    Benign essential hypertension       LORazepam 0.5 MG tablet    ATIVAN    30 tablet    Take 1 tablet (0.5 mg) by mouth every 4 hours as needed (Anxiety, Nausea/Vomiting or Sleep)    Multiple myeloma not having achieved remission (H)       nitroGLYcerin 0.4 MG sublingual tablet    NITROSTAT    25 tablet    For chest pain place 1 tablet under the tongue every 5 minutes for 3 doses. If symptoms persist 5 minutes after 1st dose call 911.    Atypical chest pain       * order for DME     1 Units    Please dispense one wheel chair    Multiple myeloma not having achieved remission (H)       * order for DME     1 Units    Dispense one 4 wheeled walker with hand brakes and seat    Multiple myeloma not having achieved remission (H)       prochlorperazine 10 MG tablet    COMPAZINE    30 tablet    Take 1 tablet (10 mg) by mouth every 6 hours as needed (Nausea/Vomiting)    Multiple myeloma not having achieved remission (H)       * Notice:  This list has 2 medication(s) that are the same as other medications prescribed for you. Read the directions  [8] : 8 [Dull/Aching] : dull/aching carefully, and ask your doctor or other care provider to review them with you.       [Stabbing] : stabbing [Constant] : constant [Household chores] : household chores [Leisure] : leisure [Sleep] : sleep [Nothing helps with pain getting better] : Nothing helps with pain getting better [Sitting] : sitting [Walking] : walking [6] : 6 [5] : 5 [FreeTextEntry1] : 02/02/2024: s/p L5-S1 LESI on 01/17/24 with 50% relief and improvement of ADLs. Pain is better but still has tingling down the legs.   Initial HPI 12/29/2023: Pain started Nov 2023 and is on the BILATERAL lower back and radiates into the bilateral buttocks and down the bilateral posterior thighs and lower legs to the toes described as an electrical pain. Left leg is much worse than the right. Saw Dr. Conner who recommended possible CHAUNCEY.   MRI Lumbar Spine 1/31/23 independently reviewed: L4-5, L5-S1 anterolisthesis, moderate central stenosis and L>R mod/severe NF stenosis with L4, L5 impingement.  Conservative Care: has Rx for PT  Pain Medications: Aleve PRB, Gabapentin 300mg QHS; completed MDP with temporary relief  Past Injections: ESIs many years ago  Spine surgery: none  Blood thinners: none [] : no [FreeTextEntry7] : hips, legs [de-identified] : L MRI [TWNoteComboBox1] : 50%

## 2024-02-21 ENCOUNTER — APPOINTMENT (OUTPATIENT)
Age: 89
End: 2024-02-21

## 2024-03-08 ENCOUNTER — APPOINTMENT (OUTPATIENT)
Dept: PAIN MANAGEMENT | Facility: CLINIC | Age: 89
End: 2024-03-08

## 2024-09-06 ENCOUNTER — APPOINTMENT (OUTPATIENT)
Dept: ORTHOPEDIC SURGERY | Facility: CLINIC | Age: 89
End: 2024-09-06
Payer: MEDICARE

## 2024-09-06 DIAGNOSIS — M54.16 RADICULOPATHY, LUMBAR REGION: ICD-10-CM

## 2024-09-06 DIAGNOSIS — M48.07 SPINAL STENOSIS, LUMBOSACRAL REGION: ICD-10-CM

## 2024-09-06 DIAGNOSIS — M43.16 SPONDYLOLISTHESIS, LUMBAR REGION: ICD-10-CM

## 2024-09-06 PROCEDURE — 99214 OFFICE O/P EST MOD 30 MIN: CPT

## 2024-09-06 PROCEDURE — 72100 X-RAY EXAM L-S SPINE 2/3 VWS: CPT

## 2024-09-06 NOTE — DISCUSSION/SUMMARY
[Medication Risks Reviewed] : Medication risks reviewed [de-identified] : reviewed the case and the imaging with the patient  sympomatic stenosis with radiculopathy  discussion of the condition and treatment options cautions discussed questions answered discussion of natural history of the condition and what the next step would be Pt she didnt relief with the injection  continue gabapentin

## 2025-02-06 ENCOUNTER — APPOINTMENT (OUTPATIENT)
Dept: ORTHOPEDIC SURGERY | Facility: CLINIC | Age: 89
End: 2025-02-06
Payer: MEDICARE

## 2025-02-06 DIAGNOSIS — S32.009A UNSPECIFIED FRACTURE OF UNSPECIFIED LUMBAR VERTEBRA, INITIAL ENCOUNTER FOR CLOSED FRACTURE: ICD-10-CM

## 2025-02-06 DIAGNOSIS — M43.16 SPONDYLOLISTHESIS, LUMBAR REGION: ICD-10-CM

## 2025-02-06 DIAGNOSIS — M48.07 SPINAL STENOSIS, LUMBOSACRAL REGION: ICD-10-CM

## 2025-02-06 PROCEDURE — 99214 OFFICE O/P EST MOD 30 MIN: CPT

## 2025-04-01 NOTE — ED ADULT TRIAGE NOTE - PATIENT'S PREFERRED PRONOUN
Called patient and notified we are canceling her appointment since she missed her lab appointment. Patient verbalized understanding.   
Her/She

## 2025-05-12 ENCOUNTER — APPOINTMENT (OUTPATIENT)
Dept: ORTHOPEDIC SURGERY | Facility: CLINIC | Age: 89
End: 2025-05-12

## 2025-05-19 ENCOUNTER — APPOINTMENT (OUTPATIENT)
Dept: ORTHOPEDIC SURGERY | Facility: CLINIC | Age: 89
End: 2025-05-19
Payer: MEDICARE

## 2025-05-19 DIAGNOSIS — M43.16 SPONDYLOLISTHESIS, LUMBAR REGION: ICD-10-CM

## 2025-05-19 PROCEDURE — 99215 OFFICE O/P EST HI 40 MIN: CPT

## 2025-05-23 ENCOUNTER — APPOINTMENT (OUTPATIENT)
Dept: PAIN MANAGEMENT | Facility: CLINIC | Age: 89
End: 2025-05-23
Payer: MEDICARE

## 2025-05-23 VITALS — WEIGHT: 163 LBS | BODY MASS INDEX: 28.88 KG/M2 | HEIGHT: 63 IN

## 2025-05-23 DIAGNOSIS — M54.16 RADICULOPATHY, LUMBAR REGION: ICD-10-CM

## 2025-05-23 DIAGNOSIS — M48.07 SPINAL STENOSIS, LUMBOSACRAL REGION: ICD-10-CM

## 2025-05-23 PROCEDURE — 99214 OFFICE O/P EST MOD 30 MIN: CPT

## 2025-05-25 ENCOUNTER — EMERGENCY (EMERGENCY)
Facility: HOSPITAL | Age: 89
LOS: 0 days | Discharge: HOME | End: 2025-05-25
Attending: STUDENT IN AN ORGANIZED HEALTH CARE EDUCATION/TRAINING PROGRAM
Payer: MEDICARE

## 2025-05-25 VITALS
DIASTOLIC BLOOD PRESSURE: 74 MMHG | HEART RATE: 68 BPM | TEMPERATURE: 98 F | OXYGEN SATURATION: 100 % | RESPIRATION RATE: 18 BRPM | SYSTOLIC BLOOD PRESSURE: 135 MMHG

## 2025-05-25 VITALS
HEART RATE: 43 BPM | RESPIRATION RATE: 18 BRPM | WEIGHT: 162.92 LBS | TEMPERATURE: 98 F | SYSTOLIC BLOOD PRESSURE: 157 MMHG | DIASTOLIC BLOOD PRESSURE: 82 MMHG | OXYGEN SATURATION: 97 % | HEIGHT: 63 IN

## 2025-05-25 DIAGNOSIS — E78.5 HYPERLIPIDEMIA, UNSPECIFIED: ICD-10-CM

## 2025-05-25 DIAGNOSIS — D25.9 LEIOMYOMA OF UTERUS, UNSPECIFIED: ICD-10-CM

## 2025-05-25 DIAGNOSIS — Z98.89 OTHER SPECIFIED POSTPROCEDURAL STATES: Chronic | ICD-10-CM

## 2025-05-25 DIAGNOSIS — K57.90 DIVERTICULOSIS OF INTESTINE, PART UNSPECIFIED, WITHOUT PERFORATION OR ABSCESS WITHOUT BLEEDING: ICD-10-CM

## 2025-05-25 DIAGNOSIS — M17.11 UNILATERAL PRIMARY OSTEOARTHRITIS, RIGHT KNEE: ICD-10-CM

## 2025-05-25 DIAGNOSIS — I10 ESSENTIAL (PRIMARY) HYPERTENSION: ICD-10-CM

## 2025-05-25 LAB
ALBUMIN SERPL ELPH-MCNC: 3.1 G/DL — LOW (ref 3.3–5)
ALP SERPL-CCNC: 65 U/L — SIGNIFICANT CHANGE UP (ref 40–120)
ALT FLD-CCNC: 18 U/L — SIGNIFICANT CHANGE UP (ref 12–78)
ANION GAP SERPL CALC-SCNC: 3 MMOL/L — LOW (ref 5–17)
AST SERPL-CCNC: 27 U/L — SIGNIFICANT CHANGE UP (ref 15–37)
BASOPHILS # BLD AUTO: 0.04 K/UL — SIGNIFICANT CHANGE UP (ref 0–0.2)
BASOPHILS NFR BLD AUTO: 0.9 % — SIGNIFICANT CHANGE UP (ref 0–2)
BILIRUB SERPL-MCNC: 0.5 MG/DL — SIGNIFICANT CHANGE UP (ref 0.2–1.2)
BUN SERPL-MCNC: 13 MG/DL — SIGNIFICANT CHANGE UP (ref 7–23)
CALCIUM SERPL-MCNC: 8 MG/DL — LOW (ref 8.5–10.1)
CHLORIDE SERPL-SCNC: 109 MMOL/L — HIGH (ref 96–108)
CO2 SERPL-SCNC: 30 MMOL/L — SIGNIFICANT CHANGE UP (ref 22–31)
CREAT SERPL-MCNC: 0.81 MG/DL — SIGNIFICANT CHANGE UP (ref 0.5–1.3)
EGFR: 70 ML/MIN/1.73M2 — SIGNIFICANT CHANGE UP
EGFR: 70 ML/MIN/1.73M2 — SIGNIFICANT CHANGE UP
EOSINOPHIL # BLD AUTO: 0.13 K/UL — SIGNIFICANT CHANGE UP (ref 0–0.5)
EOSINOPHIL NFR BLD AUTO: 2.8 % — SIGNIFICANT CHANGE UP (ref 0–6)
GLUCOSE SERPL-MCNC: 96 MG/DL — SIGNIFICANT CHANGE UP (ref 70–99)
HCT VFR BLD CALC: 42.6 % — SIGNIFICANT CHANGE UP (ref 34.5–45)
HGB BLD-MCNC: 13.6 G/DL — SIGNIFICANT CHANGE UP (ref 11.5–15.5)
IMM GRANULOCYTES NFR BLD AUTO: 0.4 % — SIGNIFICANT CHANGE UP (ref 0–0.9)
LACTATE SERPL-SCNC: 1 MMOL/L — SIGNIFICANT CHANGE UP (ref 0.7–2)
LIDOCAIN IGE QN: 38 U/L — SIGNIFICANT CHANGE UP (ref 13–75)
LYMPHOCYTES # BLD AUTO: 0.78 K/UL — LOW (ref 1–3.3)
LYMPHOCYTES # BLD AUTO: 17 % — SIGNIFICANT CHANGE UP (ref 13–44)
MCHC RBC-ENTMCNC: 28.7 PG — SIGNIFICANT CHANGE UP (ref 27–34)
MCHC RBC-ENTMCNC: 31.9 G/DL — LOW (ref 32–36)
MCV RBC AUTO: 89.9 FL — SIGNIFICANT CHANGE UP (ref 80–100)
MONOCYTES # BLD AUTO: 0.53 K/UL — SIGNIFICANT CHANGE UP (ref 0–0.9)
MONOCYTES NFR BLD AUTO: 11.5 % — SIGNIFICANT CHANGE UP (ref 2–14)
NEUTROPHILS # BLD AUTO: 3.09 K/UL — SIGNIFICANT CHANGE UP (ref 1.8–7.4)
NEUTROPHILS NFR BLD AUTO: 67.4 % — SIGNIFICANT CHANGE UP (ref 43–77)
NRBC BLD AUTO-RTO: 0 /100 WBCS — SIGNIFICANT CHANGE UP (ref 0–0)
PLATELET # BLD AUTO: 145 K/UL — LOW (ref 150–400)
POTASSIUM SERPL-MCNC: 3.1 MMOL/L — LOW (ref 3.5–5.3)
POTASSIUM SERPL-SCNC: 3.1 MMOL/L — LOW (ref 3.5–5.3)
PROT SERPL-MCNC: 6.6 GM/DL — SIGNIFICANT CHANGE UP (ref 6–8.3)
RBC # BLD: 4.74 M/UL — SIGNIFICANT CHANGE UP (ref 3.8–5.2)
RBC # FLD: 13.5 % — SIGNIFICANT CHANGE UP (ref 10.3–14.5)
SODIUM SERPL-SCNC: 142 MMOL/L — SIGNIFICANT CHANGE UP (ref 135–145)
WBC # BLD: 4.59 K/UL — SIGNIFICANT CHANGE UP (ref 3.8–10.5)
WBC # FLD AUTO: 4.59 K/UL — SIGNIFICANT CHANGE UP (ref 3.8–10.5)

## 2025-05-25 PROCEDURE — 93010 ELECTROCARDIOGRAM REPORT: CPT

## 2025-05-25 PROCEDURE — 76700 US EXAM ABDOM COMPLETE: CPT | Mod: 26

## 2025-05-25 PROCEDURE — 99285 EMERGENCY DEPT VISIT HI MDM: CPT

## 2025-05-25 PROCEDURE — 74177 CT ABD & PELVIS W/CONTRAST: CPT | Mod: 26

## 2025-05-25 NOTE — ED ADULT NURSE REASSESSMENT NOTE - NS ED NURSE REASSESS COMMENT FT1
pt returned from sono, results pending. pt awake in bed. ivr placed, lab specimen obtained results pending. pt placed on cardiac monitor. will continue to monitor.
Pt consumed 100% meal tray. Pt ambulated to restroom with steady gait and cane assist. pt denies any pain at this time. pt awaiting disposition.

## 2025-05-25 NOTE — ED PROVIDER NOTE - CLINICAL SUMMARY MEDICAL DECISION MAKING FREE TEXT BOX
89-year-old female with history of hypertension, colon CA in 1998 status post bowel resection, lumbar radiculopathy, hyperlipidemia, osteoarthritis of the right knee, pancreatic cyst, goiter and spinal stenosis presents today complaining of abdominal pain until 1 AM.  Patient states that she was at a party last night, did have fried fish, macaroni and potato salad and took a piece of beef, which she is back because it was too rare), now after getting home patient describes having upper abdominal pain which was intermittent in nature, described as sharp pains however denies nausea/vomiting/diarrhea, fevers or chills, chest pain or shortness of breath or back pains.  Patient did take Aleve prior to coming, at this time pain is controlled.  Her exam is as noted above.  Differential diagnosis includes but not limited to colitis, cholecystitis, pancreatitis, diverticulitis, mass.  Plan-pain control as needed CT and sono.  Will reassess and dispo 89-year-old female with history of hypertension, colon CA in 1998 status post bowel resection, lumbar radiculopathy, hyperlipidemia, osteoarthritis of the right knee, pancreatic cyst, goiter and spinal stenosis presents today complaining of abdominal pain until 1 AM.  Patient states that she was at a party last night, did have fried fish, macaroni and potato salad and took a piece of beef, which she is back because it was too rare), now after getting home patient describes having upper abdominal pain which was intermittent in nature, described as sharp pains however denies nausea/vomiting/diarrhea, fevers or chills, chest pain or shortness of breath or back pains.  Patient did take Aleve prior to coming, at this time pain is controlled.  Her exam is as noted above.  Differential diagnosis includes but not limited to colitis, cholecystitis, pancreatitis, diverticulitis, mass.  Plan-pain control as needed CT and sono.  Will reassess and dispo    labs reviewed, ct shows no acute pathology, chronic findings- fibroid, diverticulosis  pt able to tolerate po  pt updated on her results, plan is for follow up with her pmd

## 2025-05-25 NOTE — ED ADULT NURSE NOTE - NSFALLRISKASMTTYPE_ED_ALL_ED
Pt reports choking on soda and became concerned \"Why did I choke\". She became anxious and nauseated afterwards. Emesis x 1 on the way to hospital. She does also report itching all over.    Initial (On Arrival)

## 2025-05-25 NOTE — ED ADULT TRIAGE NOTE - CHIEF COMPLAINT QUOTE
bibems c/o abd pain after coming home from party @1am.  pt denies any n/v/d, chest pain, dizziness.  Pt noted to be jodi in triage with HR of 43.  denies any cardiac stents or underlying cardiac problems.   hx of htn, spinal stenosis. nkda. bibems c/o abd pain after coming home from party @1am.  pt denies any n/v/d, chest pain, dizziness.  Pt noted to be jodi in triage with HR of 43, asymptomatic.  denies any cardiac stents or underlying cardiac problems.   hx of htn, spinal stenosis. nkda.

## 2025-05-25 NOTE — ED PROVIDER NOTE - PATIENT PORTAL LINK FT
You can access the FollowMyHealth Patient Portal offered by [facility] by registering at the following website: [email or web address]. By joining [facility]’s FollowMyHealth portal, you will also be able to view your health information using other applications (apps) compatible with our system.

## 2025-05-25 NOTE — ED ADULT NURSE NOTE - CHIEF COMPLAINT QUOTE
bibems c/o abd pain after coming home from party @1am.  pt denies any n/v/d, chest pain, dizziness.  Pt noted to be jodi in triage with HR of 43, asymptomatic.  denies any cardiac stents or underlying cardiac problems.   hx of htn, spinal stenosis. nkda.

## 2025-05-25 NOTE — ED ADULT NURSE NOTE - NSFALLUNIVINTERV_ED_ALL_ED
Bed/Stretcher in lowest position, wheels locked, appropriate side rails in place/Call bell, personal items and telephone in reach/Instruct patient to call for assistance before getting out of bed/chair/stretcher/Non-slip footwear applied when patient is off stretcher/[place] to call system/Physically safe environment - no spills, clutter or unnecessary equipment/Purposeful proactive rounding/Room/bathroom lighting operational, light cord in reach

## 2025-05-25 NOTE — ED PROVIDER NOTE - NSDCPRINTRESULTS_ED_ALL_ED
See telephone enc 5/8/20  
Patient requests all Lab, Cardiology, and Radiology Results on their Discharge Instructions

## 2025-05-25 NOTE — ED ADULT NURSE NOTE - OBJECTIVE STATEMENT
Pt presenst to ed for abd pain since 1am after getting home from party. Admits drinking one alcholic beverage. Pain starts on sides and radiated to low abdomen. Pt found to be low HR in field but normalized at this time. Denies any CP at this time.

## 2025-06-11 ENCOUNTER — APPOINTMENT (OUTPATIENT)
Age: 89
End: 2025-06-11

## 2025-06-25 ENCOUNTER — APPOINTMENT (OUTPATIENT)
Age: 89
End: 2025-06-25
Payer: MEDICARE

## 2025-06-25 PROCEDURE — 62323 NJX INTERLAMINAR LMBR/SAC: CPT

## 2025-06-27 ENCOUNTER — APPOINTMENT (OUTPATIENT)
Dept: PAIN MANAGEMENT | Facility: CLINIC | Age: 89
End: 2025-06-27

## 2025-07-18 ENCOUNTER — APPOINTMENT (OUTPATIENT)
Dept: PAIN MANAGEMENT | Facility: CLINIC | Age: 89
End: 2025-07-18
Payer: MEDICARE

## 2025-07-18 VITALS — WEIGHT: 163 LBS | HEIGHT: 63 IN | BODY MASS INDEX: 28.88 KG/M2

## 2025-07-18 PROCEDURE — 99214 OFFICE O/P EST MOD 30 MIN: CPT

## 2025-08-06 ENCOUNTER — APPOINTMENT (OUTPATIENT)
Age: 89
End: 2025-08-06
Payer: MEDICARE

## 2025-08-06 PROCEDURE — 62323 NJX INTERLAMINAR LMBR/SAC: CPT

## 2025-09-05 ENCOUNTER — APPOINTMENT (OUTPATIENT)
Dept: PAIN MANAGEMENT | Facility: CLINIC | Age: 89
End: 2025-09-05
Payer: MEDICARE

## 2025-09-05 VITALS — WEIGHT: 163 LBS | HEIGHT: 63 IN | BODY MASS INDEX: 28.88 KG/M2

## 2025-09-05 DIAGNOSIS — M54.16 RADICULOPATHY, LUMBAR REGION: ICD-10-CM

## 2025-09-05 DIAGNOSIS — M48.07 SPINAL STENOSIS, LUMBOSACRAL REGION: ICD-10-CM

## 2025-09-05 PROCEDURE — 99213 OFFICE O/P EST LOW 20 MIN: CPT
